# Patient Record
Sex: FEMALE | Race: WHITE | NOT HISPANIC OR LATINO | Employment: OTHER | URBAN - METROPOLITAN AREA
[De-identification: names, ages, dates, MRNs, and addresses within clinical notes are randomized per-mention and may not be internally consistent; named-entity substitution may affect disease eponyms.]

---

## 2017-02-27 ENCOUNTER — ALLSCRIPTS OFFICE VISIT (OUTPATIENT)
Dept: OTHER | Facility: OTHER | Age: 47
End: 2017-02-27

## 2017-02-27 DIAGNOSIS — J20.9 ACUTE BRONCHITIS: ICD-10-CM

## 2017-02-27 DIAGNOSIS — Z00.00 ENCOUNTER FOR GENERAL ADULT MEDICAL EXAMINATION WITHOUT ABNORMAL FINDINGS: ICD-10-CM

## 2017-02-27 DIAGNOSIS — F41.9 ANXIETY DISORDER: ICD-10-CM

## 2017-02-27 DIAGNOSIS — Z87.09 PERSONAL HISTORY OF OTHER DISEASES OF THE RESPIRATORY SYSTEM: ICD-10-CM

## 2017-02-28 ENCOUNTER — GENERIC CONVERSION - ENCOUNTER (OUTPATIENT)
Dept: OTHER | Facility: OTHER | Age: 47
End: 2017-02-28

## 2017-02-28 ENCOUNTER — HOSPITAL ENCOUNTER (OUTPATIENT)
Dept: RADIOLOGY | Facility: MEDICAL CENTER | Age: 47
Discharge: HOME | End: 2017-02-28
Payer: COMMERCIAL

## 2017-02-28 ENCOUNTER — APPOINTMENT (OUTPATIENT)
Dept: LAB | Facility: MEDICAL CENTER | Age: 47
End: 2017-02-28
Payer: COMMERCIAL

## 2017-02-28 DIAGNOSIS — J20.9 ACUTE BRONCHITIS: ICD-10-CM

## 2017-02-28 DIAGNOSIS — F41.9 ANXIETY DISORDER: ICD-10-CM

## 2017-02-28 DIAGNOSIS — Z00.00 ENCOUNTER FOR GENERAL ADULT MEDICAL EXAMINATION WITHOUT ABNORMAL FINDINGS: ICD-10-CM

## 2017-02-28 DIAGNOSIS — Z87.09 PERSONAL HISTORY OF OTHER DISEASES OF THE RESPIRATORY SYSTEM: ICD-10-CM

## 2017-02-28 LAB
ALBUMIN SERPL BCP-MCNC: 3.1 G/DL (ref 3.5–5)
ALP SERPL-CCNC: 62 U/L (ref 46–116)
ALT SERPL W P-5'-P-CCNC: 14 U/L (ref 12–78)
ANION GAP SERPL CALCULATED.3IONS-SCNC: 10 MMOL/L (ref 4–13)
AST SERPL W P-5'-P-CCNC: 9 U/L (ref 5–45)
BASOPHILS # BLD AUTO: 0.07 THOUSANDS/ΜL (ref 0–0.1)
BASOPHILS NFR BLD AUTO: 1 % (ref 0–1)
BILIRUB SERPL-MCNC: 0.37 MG/DL (ref 0.2–1)
BUN SERPL-MCNC: 12 MG/DL (ref 5–25)
CALCIUM SERPL-MCNC: 8.3 MG/DL (ref 8.3–10.1)
CHLORIDE SERPL-SCNC: 102 MMOL/L (ref 100–108)
CHOLEST SERPL-MCNC: 151 MG/DL (ref 50–200)
CO2 SERPL-SCNC: 24 MMOL/L (ref 21–32)
CREAT SERPL-MCNC: 0.7 MG/DL (ref 0.6–1.3)
EOSINOPHIL # BLD AUTO: 0.41 THOUSAND/ΜL (ref 0–0.61)
EOSINOPHIL NFR BLD AUTO: 5 % (ref 0–6)
ERYTHROCYTE [DISTWIDTH] IN BLOOD BY AUTOMATED COUNT: 12.3 % (ref 11.6–15.1)
GFR SERPL CREATININE-BSD FRML MDRD: >60 ML/MIN/1.73SQ M
GLUCOSE SERPL-MCNC: 82 MG/DL (ref 65–140)
HCT VFR BLD AUTO: 43.3 % (ref 34.8–46.1)
HDLC SERPL-MCNC: 39 MG/DL (ref 40–60)
HGB BLD-MCNC: 14.8 G/DL (ref 11.5–15.4)
LDLC SERPL CALC-MCNC: 49 MG/DL (ref 0–100)
LYMPHOCYTES # BLD AUTO: 2.97 THOUSANDS/ΜL (ref 0.6–4.47)
LYMPHOCYTES NFR BLD AUTO: 39 % (ref 14–44)
MCH RBC QN AUTO: 30 PG (ref 26.8–34.3)
MCHC RBC AUTO-ENTMCNC: 34.2 G/DL (ref 31.4–37.4)
MCV RBC AUTO: 88 FL (ref 82–98)
MONOCYTES # BLD AUTO: 0.52 THOUSAND/ΜL (ref 0.17–1.22)
MONOCYTES NFR BLD AUTO: 7 % (ref 4–12)
NEUTROPHILS # BLD AUTO: 3.64 THOUSANDS/ΜL (ref 1.85–7.62)
NEUTS SEG NFR BLD AUTO: 48 % (ref 43–75)
NRBC BLD AUTO-RTO: 0 /100 WBCS
PLATELET # BLD AUTO: 337 THOUSANDS/UL (ref 149–390)
PMV BLD AUTO: 9.3 FL (ref 8.9–12.7)
POTASSIUM SERPL-SCNC: 3.7 MMOL/L (ref 3.5–5.3)
PROT SERPL-MCNC: 7.2 G/DL (ref 6.4–8.2)
RBC # BLD AUTO: 4.94 MILLION/UL (ref 3.81–5.12)
SODIUM SERPL-SCNC: 136 MMOL/L (ref 136–145)
T4 FREE SERPL-MCNC: 1.27 NG/DL (ref 0.76–1.46)
TRIGL SERPL-MCNC: 316 MG/DL
TSH SERPL DL<=0.05 MIU/L-ACNC: <0.007 UIU/ML (ref 0.36–3.74)
WBC # BLD AUTO: 7.62 THOUSAND/UL (ref 4.31–10.16)

## 2017-02-28 PROCEDURE — 36415 COLL VENOUS BLD VENIPUNCTURE: CPT

## 2017-02-28 PROCEDURE — 84439 ASSAY OF FREE THYROXINE: CPT

## 2017-02-28 PROCEDURE — 71020 HB CHEST X-RAY 2VW FRONTAL&LATL: CPT

## 2017-02-28 PROCEDURE — 85025 COMPLETE CBC W/AUTO DIFF WBC: CPT

## 2017-02-28 PROCEDURE — 84443 ASSAY THYROID STIM HORMONE: CPT

## 2017-02-28 PROCEDURE — 80053 COMPREHEN METABOLIC PANEL: CPT

## 2017-02-28 PROCEDURE — 86618 LYME DISEASE ANTIBODY: CPT

## 2017-02-28 PROCEDURE — 80061 LIPID PANEL: CPT

## 2017-03-01 ENCOUNTER — GENERIC CONVERSION - ENCOUNTER (OUTPATIENT)
Dept: OTHER | Facility: OTHER | Age: 47
End: 2017-03-01

## 2017-03-01 LAB
B BURGDOR IGG SER IA-ACNC: 0.18
B BURGDOR IGM SER IA-ACNC: 0.15

## 2017-04-08 ENCOUNTER — GENERIC CONVERSION - ENCOUNTER (OUTPATIENT)
Dept: OTHER | Facility: OTHER | Age: 47
End: 2017-04-08

## 2017-04-11 ENCOUNTER — GENERIC CONVERSION - ENCOUNTER (OUTPATIENT)
Dept: OTHER | Facility: OTHER | Age: 47
End: 2017-04-11

## 2017-04-13 ENCOUNTER — ALLSCRIPTS OFFICE VISIT (OUTPATIENT)
Dept: OTHER | Facility: OTHER | Age: 47
End: 2017-04-13

## 2017-04-17 ENCOUNTER — GENERIC CONVERSION - ENCOUNTER (OUTPATIENT)
Dept: OTHER | Facility: OTHER | Age: 47
End: 2017-04-17

## 2017-05-05 ENCOUNTER — GENERIC CONVERSION - ENCOUNTER (OUTPATIENT)
Dept: OTHER | Facility: OTHER | Age: 47
End: 2017-05-05

## 2017-05-15 ENCOUNTER — GENERIC CONVERSION - ENCOUNTER (OUTPATIENT)
Dept: OTHER | Facility: OTHER | Age: 47
End: 2017-05-15

## 2017-05-16 ENCOUNTER — GENERIC CONVERSION - ENCOUNTER (OUTPATIENT)
Dept: OTHER | Facility: OTHER | Age: 47
End: 2017-05-16

## 2017-05-28 DIAGNOSIS — E05.90 THYROTOXICOSIS WITHOUT THYROID STORM: ICD-10-CM

## 2017-07-11 ENCOUNTER — GENERIC CONVERSION - ENCOUNTER (OUTPATIENT)
Dept: OTHER | Facility: OTHER | Age: 47
End: 2017-07-11

## 2017-07-18 ENCOUNTER — GENERIC CONVERSION - ENCOUNTER (OUTPATIENT)
Dept: OTHER | Facility: OTHER | Age: 47
End: 2017-07-18

## 2017-09-13 ENCOUNTER — ALLSCRIPTS OFFICE VISIT (OUTPATIENT)
Dept: OTHER | Facility: OTHER | Age: 47
End: 2017-09-13

## 2017-10-18 ENCOUNTER — ALLSCRIPTS OFFICE VISIT (OUTPATIENT)
Dept: OTHER | Facility: OTHER | Age: 47
End: 2017-10-18

## 2017-10-25 NOTE — PROGRESS NOTES
Assessment  1  Benign essential hypertension (401 1) (I10)   2  Cervical radiculopathy at C6 (723 4) (M54 12)    Plan  Benign essential hypertension    · Lisinopril 10 MG Oral Tablet; Take 1 tablet daily   · Follow-up visit in 3 months Evaluation and Treatment  Follow-up  Status: Complete   Done: [id number]  Cervical radiculopathy at C6    · Methocarbamol 750 MG Oral Tablet (Robaxin-750); TAKE 1 TABLET 3 times daily  PRN    Discussion/Summary    Discussed patient's hypertension medication with her in detail today  She has responded very well lisinopril 10 mg once daily  We will continue on current dose which I refilled for her today and she is now to return to the office in 3 months for re-evaluation, sooner as needed if any issues arise  I also refilled her methocarbamol for p r n  use today  Possible side effects of new medications were reviewed with the patient/guardian today  The treatment plan was reviewed with the patient/guardian  The patient/guardian understands and agrees with the treatment plan      Chief Complaint  Pt presents for 1M f/u  Pt d/c Alprazolam and needs refill on Robaxin  History of Present Illness  Patient presents for follow-up of hypertension  She is taking lisinopril 10 mg daily compliantly without issues  She has no other complaints at today's visit  She does need a refill methocarbamol which she takes for chronic neck pain due to C6 radiculopathy  Review of Systems    Constitutional: No fever, no chills, feels well, no tiredness, no recent weight gain or weight loss  Cardiovascular: No complaints of slow heart rate, no fast heart rate, no chest pain, no palpitations, no leg claudication, no lower extremity edema  Respiratory: No complaints of shortness of breath, no wheezing, no cough, no SOB on exertion, no orthopnea, no PND  Gastrointestinal: No complaints of abdominal pain, no constipation, no nausea or vomiting, no diarrhea, no bloody stools     Genitourinary: No complaints of dysuria, no incontinence, no pelvic pain, no dysmenorrhea, no vaginal discharge or bleeding  Active Problems  1  Acute costochondritis (733 6) (M94 0)   2  Acute hip pain, right (719 45) (M25 551)   3  Acute upper respiratory infection (465 9) (J06 9)   4  Allergic rhinitis due to pollen (477 0) (J30 1)   5  Anxiety (300 00) (F41 9)   6  Atypical chest pain (786 59) (R07 89)   7  Benign essential hypertension (401 1) (I10)   8  Bilateral low back pain with right-sided sciatica (724 3) (M54 41)   9  Contact dermatitis due to adhesives, unspecified contact dermatitis type (692 4) (L23 1)   10  Cough (786 2) (R05)   11  Depression (311) (F32 9)   12  History of frequent upper respiratory infection (V12 69) (Z87 09)   13  Hyperthyroidism (242 90) (E05 90)   14  Neck pain (723 1) (M54 2)   15  Paresthesia of left lower extremity (782 0) (R20 2)   16  Recurrent cold sores (054 9) (B00 1)    Past Medical History  1  History of Dog bite, hand (882 0,E906 0) (T78 359Y,V06  0XXA)   2  History of acute sinusitis (V12 69) (Z87 09)   3  History of acute sinusitis (V12 69) (Z87 09)   4  History of backache (V13 59) (Z87 39)   5  History of headache (V13 89) (Z87 898)   6  History of Neck Sprain (847 0)    Surgical History  1  History of Nasal Septal Deviation Repair    Family History  Mother    1  Family history of Colon cancer  Father    2  Family history of    3  Family history of alcoholism (V17 0) (Z81 1)   4  Family history of chronic obstructive pulmonary disease (V17 6) (Z82 5)   5  Family history of depression (V17 0) (Z81 8)  Sister    10  Family history of depression (V17 0) (Z81 8)  Family History    7  Family history of Allergic Rhinitis   8  Family history of Benign Essential Hypertension   9  Family history of Coronary Artery Disease (V17 49)   10  Family history of Diabetes Mellitus (V18 0)   11   Denied: Family history of substance abuse    Social History   · Being A Social Drinker   · Denied: History of Drug Use   · Never A Smoker  The social history was reviewed and is unchanged  Current Meds   1  ALPRAZolam 0 5 MG Oral Tablet; 1/2 - 1 BID PRN; Therapy: 01MVL0561 to (Last Rx:13Sep2017) Ordered   2  Amrix 15 MG Oral Capsule Extended Release 24 Hour; TAKE 1 CAPSULE Bedtime PRN; Therapy: 13Hhl1534 to (Evaluate:23May2017); Last Rx:13Apr2017 Ordered   3  EpiPen 2-Ralph 0 3 MG/0 3ML Injection Solution Auto-injector; INJECT 0 3ML   INTRAMUSCULARLY AS DIRECTED; Therapy: 31FMC7524 to (Last Rx:08Sep2016)  Requested for: 08Sep2016 Ordered   4  Lisinopril 10 MG Oral Tablet; Take 1 tablet daily; Therapy: 71Aha3369 to (Evaluate:12Nov2017)  Requested for: 13Sep2017; Last   Rx:13Sep2017 Ordered   5  Robaxin-750 750 MG Oral Tablet; Take 1 tablet daily; Therapy: 56FMV2430 to Recorded   6  Sprintec 28 0 25-35 MG-MCG Oral Tablet; TAKE 1 TABLET Bedtime; Therapy: 49LFQ8195 to Recorded   7  ValACYclovir HCl - 1 GM Oral Tablet; TAKE 2 TABLETS BY MOUTH EVERY 12 HOURS   FOR 2 DOSES AS NEEDED; Therapy: 72FKC1488 to (Evaluate:73Tgf3841)  Requested for: 13Sep2017; Last   Rx:13Sep2017 Ordered   8  Viibryd 10 MG Oral Tablet; take 1 tablet daily prn; Therapy: 35OCD4677 to Recorded   9  Viibryd 10 MG Oral Tablet; Take 1 tablet daily; Therapy: 19YTK8423 to (Evaluate:07Mar2017); Last Rx:59Quv7247 Ordered    The medication list was reviewed and updated today  Allergies  1  Sertraline HCl TABS  2  Eggs    Vitals  Vital Signs    Recorded: 87FOR1627 04:05PM   Temperature 99 1 F, Tympanic    Heart Rate 81    Pulse Quality Normal    Respiration Quality Normal    Respiration 16    Systolic 253, LUE, Sitting    Diastolic 86, LUE, Sitting    Height 5 ft 8 in    Patient Refused Weight Yes Yes   O2 Saturation 98, RA    LMP 83Idw3839    Pain Scale 0      Physical Exam    Constitutional   General appearance: No acute distress, well appearing and well nourished      Pulmonary   Respiratory effort: No increased work of breathing or signs of respiratory distress  Auscultation of lungs: Clear to auscultation  Cardiovascular   Auscultation of heart: Normal rate and rhythm, normal S1 and S2, without murmurs  Psychiatric   Orientation to person, place, and time: Normal     Mood and affect: Normal     Vital signs reviewed   Additional Exam:  Vital signs were reviewed  Future Appointments    Date/Time Provider Specialty Site   01/18/2018 10:45 AM Haley Sellers, [facility] [address]     Signatures   Electronically signed by :  Dayanara Ny, [facility]; Oct 23 2017  9:59AM EST                       (Author)    Electronically signed by : Kath Heath DO; Oct 24 2017 10:27AM EST

## 2017-10-27 NOTE — PROGRESS NOTES
Assessment  1  Acute upper respiratory infection (465 9) (J06 9)   2  Benign essential hypertension (401 1) (I10)   3  Anxiety (300 00) (F41 9)    Plan  Anxiety    · ALPRAZolam 0 5 MG Oral Tablet; 1/2 - 1 BID PRN  Benign essential hypertension    · Lisinopril 10 MG Oral Tablet; Take 1 tablet daily  Recurrent cold sores    · ValACYclovir HCl - 1 GM Oral Tablet; TAKE 2 TABLETS BY MOUTH EVERY 12  HOURS FOR 2 DOSES AS NEEDED    Discussion/Summary    Discussed condition with patient  She appears to have an acute upper respiratory infection and may be viral but she has a history of multiple environmental allergies as well as allergies and illness-induced asthma and she has been prone to pneumonia in the past so we will treat her with an oral antibiotic today and discussed hydration, rest, OTC cold meds, and use of inhaler as needed  She is to follow-up if symptoms persist or worsen  Regarding her blood pressure, it is 160/84 in the office today and has been elevated going back previous visits consecutively  Her blood pressure monitor at home was also reading high and she does not have many significant risk factors for hypertension  She is agreeable to trial of medication we agreed to start her on lisinopril 10 mg once daily and she is to continue monitoring blood pressure home daily  She is to return the office in one month for reevaluation, sooner as needed if any issues arise  She also need a refill of alprazolam today which was approved and her anxiety appears to be well-controlled  I also refilled her Valtrex today  Possible side effects of new medications were reviewed with the patient/guardian today  The treatment plan was reviewed with the patient/guardian  The patient/guardian understands and agrees with the treatment plan      Chief Complaint  Pt presents with c/o of cough, congestion, ST and fever x 2 days and has concerns regarding elevated BP  Pt's BP in office today is 148/90   Pt has taken Mucinex with little relief  Pt needs refill on Alprazolam and Valacyclovir  Mammogram scheduled for December  History of Present Illness  HPI: Pt  presents with 2 day history of fever, chills, cough, ST  Her cough is dry  Denies N/V/D  She has taken Mucinex Cold and Flu  Has hx of allergies but no asthma  Does not smoke  Reports her allergies are perennial/year round  Has rescue inhaler to use PRN  She would also like to discuss her blood pressure today as it has been elevated and she has been using a home blood pressure monitor and she reports that her readings have been high  She does not consume excessive amounts of caffeine and says she does not have salt in her house and she does not smoke or drink alcohol  Review of Systems    Constitutional: as noted in HPI    ENT: as noted in HPI  Cardiovascular: no complaints of slow or fast heart rate, no chest pain, no palpitations, no leg claudication or lower extremity edema  Respiratory: as noted in HPI  Gastrointestinal: no complaints of abdominal pain, no constipation, no nausea or diarrhea, no vomiting, no bloody stools  Genitourinary: no complaints of dysuria, no incontinence, no pelvic pain, no dysmenorrhea, no vaginal discharge or abnormal vaginal bleeding  Active Problems  1  Acute costochondritis (733 6) (M94 0)   2  Acute hip pain, right (719 45) (M25 551)   3  Allergic rhinitis due to pollen (477 0) (J30 1)   4  Anxiety (300 00) (F41 9)   5  Atypical chest pain (786 59) (R07 89)   6  Bilateral low back pain with right-sided sciatica (724 3) (M54 41)   7  Contact dermatitis due to adhesives, unspecified contact dermatitis type (692 4) (L23 1)   8  Cough (786 2) (R05)   9  Depression (311) (F32 9)   10  History of frequent upper respiratory infection (V12 69) (Z87 09)   11  Hyperthyroidism (242 90) (E05 90)   12  Neck pain (723 1) (M54 2)   13  Paresthesia of left lower extremity (782 0) (R20 2)   14   Recurrent cold sores (054 9) (B00 1)    Past Medical History  1  History of Dog bite, hand (882 0,E906 0) (Q66 219R,D17  0XXA)   2  History of acute sinusitis (V12 69) (Z87 09)   3  History of acute sinusitis (V12 69) (Z87 09)   4  History of backache (V13 59) (Z87 39)   5  History of headache (V13 89) (Z87 898)   6  History of Neck Sprain (847 0)    Family History  Mother    1  Family history of Colon cancer  Father    2  Family history of chronic obstructive pulmonary disease (V17 6) (Z82 5)  Family History    3  Family history of Allergic Rhinitis   4  Family history of Benign Essential Hypertension   5  Family history of Coronary Artery Disease (V17 49)   6  Family history of Diabetes Mellitus (V18 0)    Social History   · Being A Social Drinker   · Denied: History of Drug Use   · Never A Smoker  The social history was reviewed and is unchanged  Surgical History  1  History of Nasal Septal Deviation Repair    Current Meds   1  ALPRAZolam 0 5 MG Oral Tablet; 1/2 - 1 BID PRN; Therapy: 26XPU8928 to (Last Rx:12Nov2015) Ordered   2  Amrix 15 MG Oral Capsule Extended Release 24 Hour; TAKE 1 CAPSULE Bedtime PRN; Therapy: 91Umw5201 to (Evaluate:07Pwu8142); Last Rx:13Apr2017 Ordered   3  Betamethasone Dipropionate Aug 0 05 % External Cream; APPLY  AND RUB  IN A THIN   FILM TO AFFECTED AREAS TWICE DAILY  (AM AND PM); Therapy: 11NTH7979 to (Last Rx:24Npa9733)  Requested for: 78Hty8865 Ordered   4  EpiPen 2-Ralph 0 3 MG/0 3ML Injection Solution Auto-injector; INJECT 0 3ML   INTRAMUSCULARLY AS DIRECTED; Therapy: 60RUX6005 to (Last Rx:68Juz5957)  Requested for: 52Bku9593 Ordered   5  ValACYclovir HCl - 1 GM Oral Tablet; TAKE 2 TABLETS BY MOUTH EVERY 12 HOURS   FOR 2 DOSES AS NEEDED; Therapy: 59CLV2537 to (Evaluate:99Tbm0054)  Requested for: 04QHB6133; Last   Rx:68Nwm6599 Ordered   6  Viibryd 10 MG Oral Tablet; Take 1 tablet daily; Therapy: 67ANM9261 to (Evaluate:07Mar2017); Last Rx:94Tfc1085 Ordered    The medication list was reviewed and updated today  Allergies  1  Sertraline HCl TABS  2  Eggs    Vitals   ** Printed in Appendix #1 below  Physical Exam    Constitutional   General appearance: No acute distress, well appearing and well nourished  Ears, Nose, Mouth, and Throat   External inspection of ears and nose: Normal     Otoscopic examination: Tympanic membranes translucent with normal light reflex  Canals patent without erythema  Nasal mucosa, septum, and turbinates: Abnormal  -- B/L boggy turbinates  Oropharynx: Abnormal  -- PND; no exudates  Pulmonary   Respiratory effort: No increased work of breathing or signs of respiratory distress  Auscultation of lungs: Clear to auscultation  Cardiovascular   Auscultation of heart: Normal rate and rhythm, normal S1 and S2, without murmurs  Lymphatic   Palpation of lymph nodes in neck: No lymphadenopathy  Psychiatric   Orientation to person, place, and time: Normal     Mood and affect: Normal     Additional Exam:  Vital signs were reviewed; neck supple  Future Appointments    Date/Time Provider Specialty Site   10/18/2017 04:00 PM Farrah Santiago [facility] [address]   Electronically signed by :  Dale Kenny, [facility]; Sep 13 2017  4:57PM EST                       (Author)    Electronically signed by : Mary Guerra DO; Sep 27 2017  6:34AM EST                       (Co-author)    Appendix #1     Patient: Aminah Owusu; : 1970; MRN: [id number]      Recorded: 2017 03:03PM Recorded: 2017 02:45PM   Temperature  98 9 F, Tympanic    Heart Rate  97    Pulse Quality  Normal    Respiration Quality  Normal    Respiration  16    Systolic 006 847, LUE, Sitting    Diastolic 84 90, LUE, Sitting    BP CUFF SIZE  Large    Patient Refused Height  Yes Yes   Patient Refused Weight  Yes Yes   O2 Saturation  97, RA    LMP  84JZN9424    Pain Scale  0

## 2017-11-28 ENCOUNTER — GENERIC CONVERSION - ENCOUNTER (OUTPATIENT)
Dept: OTHER | Facility: OTHER | Age: 47
End: 2017-11-28

## 2017-12-04 ENCOUNTER — GENERIC CONVERSION - ENCOUNTER (OUTPATIENT)
Dept: FAMILY MEDICINE CLINIC | Facility: CLINIC | Age: 47
End: 2017-12-04

## 2017-12-08 PROCEDURE — 87624 HPV HI-RISK TYP POOLED RSLT: CPT | Performed by: OBSTETRICS & GYNECOLOGY

## 2017-12-08 PROCEDURE — G0145 SCR C/V CYTO,THINLAYER,RESCR: HCPCS | Performed by: OBSTETRICS & GYNECOLOGY

## 2017-12-12 ENCOUNTER — LAB REQUISITION (OUTPATIENT)
Dept: LAB | Facility: HOSPITAL | Age: 47
End: 2017-12-12
Payer: COMMERCIAL

## 2017-12-12 DIAGNOSIS — Z01.419 ENCOUNTER FOR GYNECOLOGICAL EXAMINATION WITHOUT ABNORMAL FINDING: ICD-10-CM

## 2017-12-12 DIAGNOSIS — Z11.51 ENCOUNTER FOR SCREENING FOR HUMAN PAPILLOMAVIRUS (HPV): ICD-10-CM

## 2017-12-14 LAB
HPV RRNA GENITAL QL NAA+PROBE: NORMAL
LAB AP GYN PRIMARY INTERPRETATION: NORMAL
LAB AP LMP: NORMAL
Lab: NORMAL

## 2018-01-05 ENCOUNTER — GENERIC CONVERSION - ENCOUNTER (OUTPATIENT)
Dept: OTHER | Facility: OTHER | Age: 48
End: 2018-01-05

## 2018-01-05 ENCOUNTER — GENERIC CONVERSION - ENCOUNTER (OUTPATIENT)
Dept: FAMILY MEDICINE CLINIC | Facility: CLINIC | Age: 48
End: 2018-01-05

## 2018-01-11 NOTE — RESULT NOTES
Verified Results  (1) LYME ANTIBODY PROFILE [facility] TO WESTERN BLOT 68QYT9893 08:48AM Rohan Hyatt Order Number: [id number]     Test Name Result Flag Reference   LYME IGG 0 18  0 00-0 79   NEGATIVE(0 00-0 79)-Absence of detectable Borrelia IgG Antibodies  A negative result does not exclude the possibility of Borrelia infection  If early Lyme disease is suspected,a second sample should be collected & tested 4 weeks after initial testing  LYME IGM 0 15  0 00-0 79   NEGATIVE (0 00-0 79)-Absence of detectable Borrelia IgM antibodies  A negative result does not exclude the possibility of Borrelia infection  If early lyme disease is suspected, a second sample should be collected & tested 4 weeks after initial testing  * XR CHEST PA & LATERAL 95DUX0340 08:28AM Rohan Hyatt Order Number: [id number]     Test Name Result Flag Reference   XR CHEST PA & LATERAL (Report)     CHEST      INDICATION: J20 9: Acute bronchitis, unspecified  History taken directly from the electronic ordering system  COMPARISON: None     VIEWS: Frontal and lateral projections     IMAGES: 2     FINDINGS:        Cardiomediastinal silhouette appears unremarkable  The lungs are clear  No pneumothorax or pleural effusion  Visualized osseous structures appear within normal limits for the patient's age  IMPRESSION:     No active pulmonary disease         Workstation performed: [id number]     Signed by:   Yong Vines MD   3/1/17

## 2018-01-12 VITALS
BODY MASS INDEX: 30.18 KG/M2 | OXYGEN SATURATION: 98 % | WEIGHT: 199.13 LBS | SYSTOLIC BLOOD PRESSURE: 138 MMHG | DIASTOLIC BLOOD PRESSURE: 92 MMHG | RESPIRATION RATE: 17 BRPM | HEART RATE: 86 BPM | HEIGHT: 68 IN | TEMPERATURE: 98.7 F

## 2018-01-12 NOTE — PROGRESS NOTES
Assessment    1  Encounter for preventive health examination (V70 0) (Z00 00)   2  Anxiety (300 00) (F41 9)   3  History of frequent upper respiratory infection (V12 69) (Z87 09)   4  Acute bronchitis (466 0) (J20 9)    Plan  Acute bronchitis    · Promethazine-Codeine 6 25-10 MG/5ML Oral Syrup; TAKE 5 ML EVERY 4 HOURS  AS NEEDED   · * XR CHEST PA & LATERAL; Status:Active; Requested for:74Cna8743;   Acute bronchitis, Anxiety, Health Maintenance, History of frequent upper respiratory  infection    · (1) CBC/PLT/DIFF; Status:Active; Requested for:77Iny6598;    · (1) COMPREHENSIVE METABOLIC PANEL; Status:Active; Requested for:36Rxn3928;    · (1) LIPID PANEL FASTING W DIRECT LDL REFLEX; Status:Active; Requested  for:77Hbx7837;    · (1) LYME ANTIBODY PROFILE W/REFLEX TO WESTERN BLOT; Status:Active; Requested for:29Xcn1705;    · (1) TSH WITH FT4 REFLEX; Status:Active; Requested for:03Dos3819;     Discussion/Summary    50 yo PE/check up  Will give Rx for routine fasting blood work  Will call with results    --Frequent URIs: pt has been getting frequent respiratory infections this winter (since oct)  she has been seen by [facility] urgent care 3 times (pneumonia, influenza, and viral uri)  pt c/o intermittent fevers (none currently), cough, pnd, congestion  Upon examination, her lungs are somewhat coarse  Will send for chest x-ray, will give Rx for Prometh with codeine, and will check labs  Will call  --Anxiety: Situational  Overall has improved greatly since her work situation has improved  Patient still working for "[employer] but they are no longer requiring her to travel to [place]  Doing well since discontinuing viibryd  Still takes alprazolam on rare occasions without side effects  Will call  Possible side effects of new medications were reviewed with the patient/guardian today  The treatment plan was reviewed with the patient/guardian   The patient/guardian understands and agrees with the treatment plan      Chief Complaint  PT presents for Annual physical       History of Present Illness  HPI: 52 yo PE/check up  pt has been getting frequent respiratory infections this winter (since oct)  she has been seen by [facility] urgent care 3 times (pneumonia, influenza, and viral uri)  pt c/o intermittent fevers (none currently), cough, pnd, congestion  pt recently stopped her viibryd  she is doing well off med  still w/ rare use of alprazolam       Review of Systems    Constitutional: No fever, no chills, feels well, no tiredness, no recent weight gain or weight loss  ENT: as noted in HPI  Cardiovascular: No complaints of slow heart rate, no fast heart rate, no chest pain, no palpitations, no leg claudication, no lower extremity edema  Respiratory: as noted in HPI  Gastrointestinal: No complaints of abdominal pain, no constipation, no nausea or vomiting, no diarrhea, no bloody stools  Genitourinary: No complaints of dysuria, no incontinence, no pelvic pain, no dysmenorrhea, no vaginal discharge or bleeding  Over the past 2 weeks, how often have you been bothered by the following problems? 1 ) Little interest or pleasure in doing things? Not at all    2 ) Feeling down, depressed or hopeless? Not at all    3 ) Trouble falling asleep or sleeping too much? Several days  4 ) Feeling tired or having little energy? Several days  5 ) Poor appetite or overeating? Several days  6 ) Feeling bad about yourself, or that you are a failure, or have let yourself or your family down? Not at all    7 ) Trouble concentrating on things, such as reading a newspaper or watching television? Not at all    8 ) Moving or speaking so slowly that other people could have noticed, or the opposite, moving or speaking faster than usual? Not at all  How difficult have these problems made it for you to do your work, take care of things at home, or get along with people? Not at all  Active Problems    1   Acute hip pain, right (815 65) (M25 551)   2  Allergic rhinitis due to pollen (477 0) (J30 1)   3  Anxiety (300 00) (F41 9)   4  Bilateral low back pain with right-sided sciatica (724 3) (M54 41)   5  Contact dermatitis due to adhesives, unspecified contact dermatitis type (692 4) (L23 1)   6  Depression (311) (F32 9)   7  Neck pain (723 1) (M54 2)   8  Paresthesia of left lower extremity (782 0) (R20 2)   9  Recurrent cold sores (054 9) (B00 1)    Past Medical History    · History of Dog bite, hand (882 0,E906 0) (R86 755L,Q27  0XXA)   · History of acute sinusitis (V12 69) (Z87 09)   · History of acute sinusitis (V12 69) (Z87 09)   · History of backache (V13 59) (Z87 39)   · History of headache (V13 89) (Z87 898)   · History of Neck Sprain (847 0)    Surgical History    · History of Nasal Septal Deviation Repair    Family History  Mother    · Family history of Colon cancer  Father    · Family history of chronic obstructive pulmonary disease (V17 6) (Z82 5)  Family History    · Family history of Allergic Rhinitis   · Family history of Benign Essential Hypertension   · Family history of Coronary Artery Disease (V17 49)   · Family history of Diabetes Mellitus (V18 0)    Social History    · Being A Social Drinker   · Denied: History of Drug Use   · Never A Smoker    Current Meds   1  ALPRAZolam 0 5 MG Oral Tablet; 1/2 - 1 BID PRN; Therapy: 84WVF9238 to (Last Rx:12Nov2015) Ordered   2  Betamethasone Dipropionate Aug 0 05 % External Cream; APPLY  AND RUB  IN A THIN   FILM TO AFFECTED AREAS TWICE DAILY  (AM AND PM); Therapy: 58QMF3014 to (Last Rx:08Sep2016)  Requested for: 08Sep2016 Ordered   3  EpiPen 2-Ralph 0 3 MG/0 3ML Injection Solution Auto-injector; INJECT 0 3ML   INTRAMUSCULARLY AS DIRECTED; Therapy: 63IFM9502 to (Last Rx:08Sep2016)  Requested for: 08Sep2016 Ordered   4  ValACYclovir HCl - 1 GM Oral Tablet; TAKE 2 TABLETS BY MOUTH EVERY 12 HOURS   FOR 2 DOSES AS NEEDED;    Therapy: 59BKZ1958 to (Evaluate:81Siv6528)  Requested for: 12VEV1170; Last   Rx:78Exz9343 Ordered   5  Viibryd 10 MG Oral Tablet; Take 1 tablet daily; Therapy: 87XAR0514 to (Evaluate:07Mar2017); Last Rx:26Jlm9006 Ordered    Allergies    1  Sertraline HCl TABS    2  Eggs    Vitals   Recorded: 47Oxg6552 03:03PM   Temperature 98 2 F, Tympanic   Heart Rate 95   Respiration Quality Norm   Respiration 16   Systolic 132   Diastolic 90   Height 5 ft 7 in   Weight 197 lb 3 04 oz   BMI Calculated 30 88   BSA Calculated 2 01   O2 Saturation 95, RA   LMP 01-Feb-2017   Pain Scale 0     Physical Exam    Constitutional   General appearance: No acute distress, well appearing and well nourished  Ears, Nose, Mouth, and Throat   Otoscopic examination: Abnormal   turbs mildly inflamed  Oropharynx: Normal with no erythema, edema, exudate or lesions  Pulmonary   Respiratory effort: No increased work of breathing or signs of respiratory distress  Auscultation of lungs: Abnormal   clearing w/ cough  Cardiovascular   Palpation of heart: Normal PMI, no thrills  Auscultation of heart: Normal rate and rhythm, normal S1 and S2, without murmurs  Examination of extremities for edema and/or varicosities: Normal     Abdomen   Abdomen: Non-tender, no masses  Liver and spleen: No hepatomegaly or splenomegaly  Lymphatic   Palpation of lymph nodes in neck: No lymphadenopathy  Psychiatric   Orientation to person, place, and time: Normal     Mood and affect: Normal        Signatures   Electronically signed by :  Tavia Cano DO; Feb 27 2017  3:28PM EST                       (Author)

## 2018-01-13 VITALS
SYSTOLIC BLOOD PRESSURE: 122 MMHG | RESPIRATION RATE: 16 BRPM | OXYGEN SATURATION: 98 % | HEART RATE: 81 BPM | DIASTOLIC BLOOD PRESSURE: 86 MMHG | TEMPERATURE: 99.1 F | HEIGHT: 68 IN

## 2018-01-14 VITALS
BODY MASS INDEX: 30.95 KG/M2 | TEMPERATURE: 98.2 F | OXYGEN SATURATION: 95 % | SYSTOLIC BLOOD PRESSURE: 150 MMHG | RESPIRATION RATE: 16 BRPM | HEIGHT: 67 IN | WEIGHT: 197.19 LBS | DIASTOLIC BLOOD PRESSURE: 90 MMHG | HEART RATE: 95 BPM

## 2018-01-15 VITALS
TEMPERATURE: 98.9 F | SYSTOLIC BLOOD PRESSURE: 160 MMHG | OXYGEN SATURATION: 97 % | RESPIRATION RATE: 16 BRPM | HEART RATE: 97 BPM | DIASTOLIC BLOOD PRESSURE: 84 MMHG

## 2018-01-16 NOTE — RESULT NOTES
Verified Results  (1) CBC/PLT/DIFF [id number] 08:48AM [facility] Order Number: [id number]     Test Name Result Flag Reference   WBC COUNT 7 62 Thousand/uL  4 31-10 16   RBC COUNT 4 94 Million/uL  3 81-5 12   HEMOGLOBIN 14 8 g/dL  11 5-15 4   HEMATOCRIT 43 3 %  34 8-46  1   MCV 88 fL  82-98   MCH 30 0 pg  26 8-34 3   MCHC 34 2 g/dL  31 4-37 4   RDW 12 3 %  11 6-15 1   MPV 9 3 fL  8 9-12 7   PLATELET COUNT 598 Thousands/uL  149-390   nRBC AUTOMATED 0 /100 WBCs     NEUTROPHILS RELATIVE PERCENT 48 %  43-75   LYMPHOCYTES RELATIVE PERCENT 39 %  14-44   MONOCYTES RELATIVE PERCENT 7 %  4-12   EOSINOPHILS RELATIVE PERCENT 5 %  0-6   BASOPHILS RELATIVE PERCENT 1 %  0-1   NEUTROPHILS ABSOLUTE COUNT 3 64 Thousands/? ??L  1 85-7 62   LYMPHOCYTES ABSOLUTE COUNT 2 97 Thousands/? ??L  0 60-4 47   MONOCYTES ABSOLUTE COUNT 0 52 Thousand/? ??L  0 17-1 22   EOSINOPHILS ABSOLUTE COUNT 0 41 Thousand/? ??L  0 00-0 61   BASOPHILS ABSOLUTE COUNT 0 07 Thousands/? ??L  0 00-0 10   - Patient Instructions: This bloodwork is non-fasting  Please drink two glasses of water morning of bloodwork  - Patient Instructions: This bloodwork is non-fasting  Please drink two glasses of water morning of bloodwork  (1) COMPREHENSIVE METABOLIC PANEL [id number] 54:11WM [facility] Order Number: [id number]     Test Name Result Flag Reference   GLUCOSE,RANDM 82 mg/dL     If the patient is fasting, the ADA then defines impaired fasting glucose as > 100 mg/dL and diabetes as > or equal to 123 mg/dL     SODIUM 136 mmol/L  136-145   POTASSIUM 3 7 mmol/L  3 5-5 3   CHLORIDE 102 mmol/L  100-108   CARBON DIOXIDE 24 mmol/L  21-32   ANION GAP (CALC) 10 mmol/L  4-13   BLOOD UREA NITROGEN 12 mg/dL  5-25   CREATININE 0 70 mg/dL  0 60-1 30   Standardized to IDMS reference method   CALCIUM 8 3 mg/dL  8 3-10 1   BILI, TOTAL 0 37 mg/dL  0 20-1 00   ALK PHOSPHATAS 62 U/L     ALT (SGPT) 14 U/L  12-78   AST(SGOT) 9 U/L  5-45   ALBUMIN 3 1 g/dL L 3 5-5 0   TOTAL PROTEIN 7 2 g/dL  6 4-8 2   eGFR Non-African American      >60 0 ml/min/1 73sq m   - Patient Instructions: This is a fasting blood test  Water, black tea or black coffee only after 9:00pm the night before test Drink 2 glasses of water the morning of test   National Kidney Disease Education Program recommendations are as follows:  GFR calculation is accurate only with a steady state creatinine  Chronic Kidney disease less than 60 ml/min/1 73 sq  meters  Kidney failure less than 15 ml/min/1 73 sq  meters  (1) LIPID PANEL FASTING W DIRECT LDL REFLEX [id number] 08:48AM [facility] Order Number: [id number]     Test Name Result Flag Reference   CHOLESTEROL 151 mg/dL     LDL CHOLESTEROL CALCULATED 49 mg/dL  0-100   - Patient Instructions: This is a fasting blood test  Water, black tea or black coffee only after 9:00pm the night before test   Drink 2 glasses of water the morning of test     - Patient Instructions: This is a fasting blood test  Water, black tea or black coffee only after 9:00pm the night before test Drink 2 glasses of water the morning of test   Triglyceride:         Normal              <150 mg/dl       Borderline High    150-199 mg/dl       High               200-499 mg/dl       Very High          >499 mg/dl  Cholesterol:         Desirable        <200 mg/dl      Borderline High  200-239 mg/dl      High             >239 mg/dl  HDL Cholesterol:        High    >59 mg/dL      Low     <41 mg/dL  LDL Cholesterol:        Optimal          <100 mg/dl        Near Optimal     100-129 mg/dl        Above Optimal          Borderline High   130-159 mg/dl          High              160-189 mg/dl          Very High        >189 mg/dl  LDL CALCULATED:    This screening LDL is a calculated result  It does not have the accuracy of the Direct Measured LDL in the monitoring of patients with hyperlipidemia and/or statin therapy     Direct Measure LDL (SOA188) must be ordered separately in these patients  TRIGLYCERIDES 316 mg/dL H <=150   Specimen collection should occur prior to N-Acetylcysteine or Metamizole administration due to the potential for falsely depressed results  HDL,DIRECT 39 mg/dL L 40-60   Specimen collection should occur prior to Metamizole administration due to the potential for falsely depressed results  (1) TSH WITH FT4 REFLEX [id number] 08:48AM [facility] Order Number: [id number]     Test Name Result Flag Reference   TSH <0 007 uIU/mL L 0 358-3 740   - Patient Instructions: This is a fasting blood test  Water, black tea or black coffee only after 9:00pm the night before test Drink 2 glasses of water the morning of test   Patients undergoing fluorescein dye angiography may retain small amounts of fluorescein in the body for 48-72 hours post procedure  Samples containing fluorescein can produce falsely depressed TSH values  If the patient had this procedure,a specimen should be resubmitted post fluorescein clearance  The recommended reference ranges for TSH during pregnancy are as follows:  First trimester 0 1 to 2 5 uIU/mL  Second trimester  0 2 to 3 0 uIU/mL  Third trimester 0 3 to 3 0 uIU/m   T4,FREE 1 27 ng/dL  0 76-1 46   - Patient Instructions: This is a fasting blood test  Water, black tea or black coffee only after 9:00pm the night before test Drink 2 glasses of water the morning of test        Plan  Hyperthyroidism    · (1) T4, FREE; Status:Active; Requested for:28May2017;    · (1) THYROID ANTIBODIES PANEL; Status:Active; Requested for:28May2017;    · (1) TSH; Status:Active;  Requested for:28May2017;

## 2018-01-18 ENCOUNTER — ALLSCRIPTS OFFICE VISIT (OUTPATIENT)
Dept: OTHER | Facility: OTHER | Age: 48
End: 2018-01-18

## 2018-01-19 NOTE — PROGRESS NOTES
Assessment   1  Benign essential hypertension (401 1) (I10)    Plan   Benign essential hypertension    · Lisinopril 10 MG Oral Tablet; Take 1 tablet daily   · Follow-up visit in 6 months Evaluation and Treatment  Follow-up  Status: Complete -    Scheduling  Done: 18UTC7401 01:18PM  Benign essential hypertension, Fatigue, unspecified type, Hypertriglyceridemia, Vitamin    D deficiency    · (1) CBC/PLT/DIFF; Status:Active; Requested for:89Fqa0832;    · (1) COMPREHENSIVE METABOLIC PANEL; Status:Active; Requested for:53Skq1423;    · (1) LIPID PANEL, FASTING; Status:Active; Requested for:28Oaa9752;    · (1) VITAMIN D 25-HYDROXY; Status:Active; Requested for:28Qqb6349;   PMH: History of acute sinusitis    · EpiPen 2-Ralph 0 3 MG/0 3ML Injection Solution Auto-injector; INJECT 0 3ML    INTRAMUSCULARLY AS DIRECTED    Discussion/Summary      Discussed pt's HTN and medication with her in detail today  She is well controlled on Lisinopril 10 mg daily so I refilled for her and will continue at current dose  Gave her Rx for FBW and will call with results once obtained  I renewed her Epipen today  She is to RTO in 6 months for reevaluation, sooner PRN if any issues arise  Possible side effects of new medications were reviewed with the patient/guardian today  The treatment plan was reviewed with the patient/guardian  The patient/guardian understands and agrees with the treatment plan      Chief Complaint   Pt presents for 3 month check up with no recent labs  Pt needs refill on Lisinopril and Epi Pen  Pt has orders for Mammogram and Colonoscopy, states will schedule  History of Present Illness   Pt  presents for F/U of HTN  She is feeling well and without complaints today  She is taking her Lisinopril 10 mg daily and compliantly as prescribed and needs refill today  She also needs refill of her Epipen  She will be due for yearly FBW next month   She is UTD with Pap and has Rx for mammogram       Review of Systems Constitutional: No fever, no chills, feels well, no tiredness, no recent weight gain or weight loss  Cardiovascular: No complaints of slow heart rate, no fast heart rate, no chest pain, no palpitations, no leg claudication, no lower extremity edema  Respiratory: No complaints of shortness of breath, no wheezing, no cough, no SOB on exertion, no orthopnea, no PND  Gastrointestinal: No complaints of abdominal pain, no constipation, no nausea or vomiting, no diarrhea, no bloody stools  Genitourinary: No complaints of dysuria, no incontinence, no pelvic pain, no dysmenorrhea, no vaginal discharge or bleeding  Active Problems   1  Anxiety (300 00) (F41 9)   2  Benign essential hypertension (401 1) (I10)   3  Bilateral low back pain with right-sided sciatica (724 3) (M54 41)   4  Cervical radiculopathy at C6 (723 4) (M54 12)   5  Depression (311) (F32 9)   6  Hyperthyroidism (242 90) (E05 90)    Past Medical History   1  History of Acute costochondritis (733 6) (M94 0)   2  History of Acute hip pain, right (719 45) (M25 551)   3  History of Acute upper respiratory infection (465 9) (J06 9)   4  History of Allergic rhinitis due to pollen (477 0) (J30 1)   5  History of Atypical chest pain (786 59) (R07 89)   6  History of Contact dermatitis due to adhesives, unspecified contact dermatitis type     (692 4) (L23 1)   7  History of Dog bite, hand (882 0,E906 0) (U03 598Y,K70  0XXA)   8  History of acute sinusitis (V12 69) (Z87 09)   9  History of acute sinusitis (V12 69) (Z87 09)   10  History of backache (V13 59) (Z87 39)   11  History of cough   12  History of frequent upper respiratory infection (V12 69) (Z87 09)   13  History of headache (V13 89) (Z87 898)   14  History of influenza vaccination (V49 89) (Z92 29)   15  History of neck pain (V13 59) (Z87 39)   16  History of Neck Sprain (847 0)   17  History of Paresthesia of left lower extremity (782 0) (R20 2)   18   History of Recurrent cold sores [phone]  9) (B00 1)   19  History of Screening for cervical cancer (V76 2) (Z12 4)    Surgical History   1  History of Nasal Septal Deviation Repair    Family History   Mother    1  Family history of Colon cancer  Father    2  Family history of    3  Family history of alcoholism (V17 0) (Z81 1)   4  Family history of chronic obstructive pulmonary disease (V17 6) (Z82 5)   5  Family history of depression (V17 0) (Z81 8)  Sister    10  Family history of depression (V17 0) (Z81 8)  Family History    7  Family history of Allergic Rhinitis   8  Family history of Benign Essential Hypertension   9  Family history of Coronary Artery Disease (V17 49)   10  Family history of Diabetes Mellitus (V18 0)   11  Denied: Family history of substance abuse    Social History    · Being A Social Drinker   · Denied: History of Drug Use   · Never A Smoker  The social history was reviewed and is unchanged  Current Meds    1  ALPRAZolam 0 5 MG Oral Tablet; 1/2 - 1 BID PRN; Therapy: 10SRI7359 to (Last Rx:2017) Ordered   2  EpiPen 2-Ralph 0 3 MG/0 3ML Injection Solution Auto-injector; INJECT 0 3ML     INTRAMUSCULARLY AS DIRECTED; Therapy: 24HYC6351 to (Last Rx:2016)  Requested for: 2016 Ordered   3  Flax Seed Oil 1000 MG Oral Capsule; TAKE AS DIRECTED; Therapy: 46ANT5110 to Recorded   4  Lisinopril 10 MG Oral Tablet; Take 1 tablet daily; Therapy: 28Wgq3055 to (Evaluate:2018)  Requested for: 47XDP1343; Last     Rx:2017 Ordered   5  Methocarbamol 750 MG Oral Tablet; TAKE 1 TABLET 3 times daily PRN; Therapy: 78QFS7502 to (Evaluate:2017)  Requested for: 03JEE0360; Last     Rx:2017 Ordered   6  Multivitamins Oral Capsule; Therapy: 95JWP6596 to Recorded   7  Sprintec 28 0 25-35 MG-MCG Oral Tablet; TAKE 1 TABLET Bedtime; Therapy: 99TAJ6388 to Recorded   8  Tapazole 5 MG Oral Tablet; Take 1 tablet twice daily; Therapy: 65UAF1809 to Recorded   9   ValACYclovir HCl - 1 GM Oral Tablet; TAKE 2 TABLETS BY MOUTH EVERY 12 HOURS     FOR 2 DOSES AS NEEDED; Therapy: 00SES3493 to (Evaluate:2017)  Requested for: 08Mga0426; Last     Rx:13Fpi5216 Ordered   10  Viibryd 10 MG Oral Tablet; Take 1 tablet daily; Therapy: 85PSC6596 to (Evaluate:2017); Last Rx:92Hlh6950 Ordered     The medication list was reviewed and updated today  Allergies   1  Sertraline HCl TABS  2  Eggs    Vitals   Vital Signs    ** Printed in Appendix #1 below  Physical Exam        Constitutional      General appearance: No acute distress, well appearing and well nourished  Ears, Nose, Mouth, and Throat      Oropharynx: Normal with no erythema, edema, exudate or lesions  Pulmonary      Respiratory effort: No increased work of breathing or signs of respiratory distress  Auscultation of lungs: Clear to auscultation  Cardiovascular      Auscultation of heart: Normal rate and rhythm, normal S1 and S2, without murmurs  Examination of extremities for edema and/or varicosities: Normal        Carotid pulses: Normal        Lymphatic      Palpation of lymph nodes in neck: No lymphadenopathy  Psychiatric      Orientation to person, place, and time: Normal        Mood and affect: Normal        Additional Exam:  Vital signs were reviewed; neck supple; no thyromegaly  Signatures    Electronically signed by : Patrick Cordova, [facility]; 2018  1:21PM EST                       (Author)     Electronically signed by :  Rubén Mclaughlin DO; 2018  2:45PM EST                       (Author)     Appendix #1          Vital Signs   Patient: David Sosa; : 1970; MRN: [id number]           Recorded: 87VHY5890 11:17AM Recorded: 12JTI1556 10:59AM   Temperature  98 8 F, Tympanic    Heart Rate  83    Pulse Quality  Normal    Respiration Quality  Normal    Respiration  18    Systolic 434 698, LUE, Sitting    Diastolic 66 74, LUE, Sitting    BP CUFF SIZE  Large    Patient Refused Height Yes Yes   Patient Refused Weight  Yes Yes   O2 Saturation  97, RA    LMP  11Jan2018    Pain Scale  4

## 2018-01-23 VITALS
RESPIRATION RATE: 18 BRPM | OXYGEN SATURATION: 97 % | TEMPERATURE: 98.8 F | DIASTOLIC BLOOD PRESSURE: 66 MMHG | HEART RATE: 83 BPM | SYSTOLIC BLOOD PRESSURE: 132 MMHG

## 2018-02-28 DIAGNOSIS — E78.1 PURE HYPERGLYCERIDEMIA: ICD-10-CM

## 2018-02-28 DIAGNOSIS — E55.9 VITAMIN D DEFICIENCY: ICD-10-CM

## 2018-02-28 DIAGNOSIS — I10 ESSENTIAL (PRIMARY) HYPERTENSION: ICD-10-CM

## 2018-02-28 DIAGNOSIS — R53.83 OTHER FATIGUE: ICD-10-CM

## 2018-04-30 RX ORDER — LISINOPRIL 10 MG/1
1 TABLET ORAL DAILY
COMMUNITY
Start: 2017-09-13 | End: 2018-07-27 | Stop reason: SDUPTHER

## 2018-04-30 RX ORDER — EPINEPHRINE 0.3 MG/.3ML
0.3 INJECTION SUBCUTANEOUS
COMMUNITY
Start: 2013-01-16 | End: 2019-05-13 | Stop reason: SDUPTHER

## 2018-04-30 RX ORDER — METHIMAZOLE 5 MG/1
1 TABLET ORAL DAILY
COMMUNITY
Start: 2018-01-18 | End: 2022-02-28

## 2018-04-30 RX ORDER — ALPRAZOLAM 0.5 MG/1
TABLET ORAL 2 TIMES DAILY PRN
COMMUNITY
Start: 2015-11-12 | End: 2019-05-13 | Stop reason: SDUPTHER

## 2018-04-30 RX ORDER — DIMENHYDRINATE 50 MG
TABLET ORAL
COMMUNITY
Start: 2018-01-18 | End: 2018-07-16 | Stop reason: SDUPTHER

## 2018-04-30 RX ORDER — CYCLOBENZAPRINE HYDROCHLORIDE 15 MG/1
1 CAPSULE, EXTENDED RELEASE ORAL
COMMUNITY
Start: 2017-04-13 | End: 2019-02-07

## 2018-04-30 RX ORDER — NORGESTIMATE AND ETHINYL ESTRADIOL 0.25-0.035
1 KIT ORAL
COMMUNITY
Start: 2017-10-18 | End: 2019-06-26 | Stop reason: DRUGHIGH

## 2018-04-30 RX ORDER — VALACYCLOVIR HYDROCHLORIDE 1 G/1
TABLET, FILM COATED ORAL
COMMUNITY
Start: 2015-12-03 | End: 2019-02-07 | Stop reason: SDUPTHER

## 2018-04-30 RX ORDER — METHOCARBAMOL 750 MG/1
1 TABLET, FILM COATED ORAL 3 TIMES DAILY
COMMUNITY
Start: 2017-10-18 | End: 2018-05-01 | Stop reason: SDUPTHER

## 2018-04-30 RX ORDER — VILAZODONE HYDROCHLORIDE 10 MG/1
1 TABLET ORAL DAILY
COMMUNITY
Start: 2016-03-04 | End: 2018-05-01 | Stop reason: SDUPTHER

## 2018-05-01 ENCOUNTER — TELEPHONE (OUTPATIENT)
Dept: FAMILY MEDICINE CLINIC | Facility: CLINIC | Age: 48
End: 2018-05-01

## 2018-05-01 ENCOUNTER — OFFICE VISIT (OUTPATIENT)
Dept: FAMILY MEDICINE CLINIC | Facility: CLINIC | Age: 48
End: 2018-05-01
Payer: COMMERCIAL

## 2018-05-01 ENCOUNTER — DOCUMENTATION (OUTPATIENT)
Dept: FAMILY MEDICINE CLINIC | Facility: CLINIC | Age: 48
End: 2018-05-01

## 2018-05-01 VITALS
DIASTOLIC BLOOD PRESSURE: 82 MMHG | HEART RATE: 100 BPM | RESPIRATION RATE: 16 BRPM | OXYGEN SATURATION: 97 % | SYSTOLIC BLOOD PRESSURE: 132 MMHG | TEMPERATURE: 98.6 F

## 2018-05-01 DIAGNOSIS — R55 NEAR SYNCOPE: Primary | ICD-10-CM

## 2018-05-01 DIAGNOSIS — F32.A DEPRESSION, UNSPECIFIED DEPRESSION TYPE: ICD-10-CM

## 2018-05-01 DIAGNOSIS — R42 VERTIGO: ICD-10-CM

## 2018-05-01 DIAGNOSIS — R47.81 SLURRED SPEECH: ICD-10-CM

## 2018-05-01 DIAGNOSIS — M54.9 BACK PAIN, UNSPECIFIED BACK LOCATION, UNSPECIFIED BACK PAIN LATERALITY, UNSPECIFIED CHRONICITY: ICD-10-CM

## 2018-05-01 PROCEDURE — 99214 OFFICE O/P EST MOD 30 MIN: CPT | Performed by: FAMILY MEDICINE

## 2018-05-01 RX ORDER — METHOCARBAMOL 750 MG/1
750 TABLET, FILM COATED ORAL 3 TIMES DAILY
Qty: 120 TABLET | Refills: 1 | Status: SHIPPED | OUTPATIENT
Start: 2018-05-01 | End: 2018-07-28 | Stop reason: SDUPTHER

## 2018-05-01 RX ORDER — NORGESTIMATE AND ETHINYL ESTRADIOL 0.25-0.035
KIT ORAL
COMMUNITY
End: 2018-07-16 | Stop reason: SDUPTHER

## 2018-05-01 RX ORDER — BUPIVACAINE HYDROCHLORIDE 2.5 MG/ML
1 INJECTION, SOLUTION INFILTRATION; PERINEURAL
COMMUNITY
End: 2019-02-07

## 2018-05-01 RX ORDER — PREDNISONE 20 MG/1
TABLET ORAL
COMMUNITY
Start: 2017-11-22 | End: 2018-05-01 | Stop reason: ALTCHOICE

## 2018-05-01 RX ORDER — VILAZODONE HYDROCHLORIDE 10 MG/1
10 TABLET ORAL DAILY
Qty: 90 TABLET | Refills: 1 | Status: SHIPPED | OUTPATIENT
Start: 2018-05-01 | End: 2018-07-28 | Stop reason: SDUPTHER

## 2018-05-01 RX ORDER — MECLIZINE HYDROCHLORIDE 25 MG/1
25 TABLET ORAL EVERY 8 HOURS PRN
COMMUNITY
Start: 2018-04-30

## 2018-05-01 RX ORDER — OYSTER SHELL CALCIUM WITH VITAMIN D 500; 200 MG/1; [IU]/1
1 TABLET, FILM COATED ORAL
COMMUNITY
End: 2019-05-13

## 2018-05-01 RX ORDER — ALBUTEROL SULFATE 90 UG/1
2 AEROSOL, METERED RESPIRATORY (INHALATION) EVERY 4 HOURS
COMMUNITY
Start: 2017-02-23 | End: 2018-07-16 | Stop reason: SDUPTHER

## 2018-05-01 RX ORDER — ECHINACEA 400 MG
1 CAPSULE ORAL DAILY
COMMUNITY
End: 2022-02-28

## 2018-05-01 RX ORDER — LANOLIN ALCOHOL/MO/W.PET/CERES
CREAM (GRAM) TOPICAL
COMMUNITY
End: 2022-08-03

## 2018-05-01 RX ORDER — FEXOFENADINE HCL 180 MG/1
180 TABLET ORAL DAILY
COMMUNITY
End: 2022-08-03

## 2018-05-01 NOTE — TELEPHONE ENCOUNTER
Pt was in our office on 05/01/2018 and was ordered an MRI of brain IAC wo contrast does pt need a prior auth? I informed pt to wait to schedule the appointment once she heard form you

## 2018-05-01 NOTE — ASSESSMENT & PLAN NOTE
This is a follow up from ER visit at [facility] from 4/29  Pt was seen due to episode of dizziness (vertigo), slurred speech, chest pain  Pt was brought to ER via ambulance  ER ruled out MI (EKG/labs)  She was given rx for meclizine and subsequently d/c w/ instructions to f/u w/ her PCP  Currently, she is feeling somewhat better, but still feels strange "in a dream like state"  Her examination today reveals positive Romberg sign, otherwise negative  Although it is possible/likely that her symptoms are due to peripheral vertigo, I am concerned regarding the persistence of the symptoms as well as episode of slurred speech  Will sent for MRI of brain w IACs, as well as carotid ultrasound  If negative, consider referral to ENT if vertigo persists    Continue meclizine as needed

## 2018-05-01 NOTE — PROGRESS NOTES
Pt is scheduled for a routine VAS Carotid complete study on 05/07/18 at 4:45 pm at [facility]

## 2018-05-01 NOTE — PROGRESS NOTES
Assessment/Plan:    Near syncope  This is a follow up from ER visit at [facility] from 4/29  Pt was seen due to episode of dizziness (vertigo), slurred speech, chest pain  Pt was brought to ER via ambulance  ER ruled out MI (EKG/labs)  She was given rx for meclizine and subsequently d/c w/ instructions to f/u w/ her PCP  Currently, she is feeling somewhat better, but still feels strange "in a dream like state"  Her examination today reveals positive Romberg sign, otherwise negative  Although it is possible/likely that her symptoms are due to peripheral vertigo, I am concerned regarding the persistence of the symptoms as well as episode of slurred speech  Will sent for MRI of brain w IACs, as well as carotid ultrasound  If negative, consider referral to ENT if vertigo persists  Continue meclizine as needed    Slurred speech  See  "near syncope"    Vertigo  As above  Continue meclizine prn  If symptoms persist, will refer to ENT       Diagnoses and all orders for this visit:    Near syncope  -     MRI brain IAC wo contrast; Future  -     VAS carotid complete study; Future    Slurred speech  -     MRI brain IAC wo contrast; Future  -     VAS carotid complete study; Future    Vertigo  -     MRI brain IAC wo contrast; Future    Other orders  -     albuterol (PROVENTIL HFA,VENTOLIN HFA) 90 mcg/act inhaler; Inhale 2 puffs every 4 (four) hours          Subjective:      Patient ID: Choco Green is a 51 y o  female  This is a follow up from ER visit at [facility] from 4/29  Pt was seen due to episode of dizziness (vertigo), slurred speech, chest pain  Pt was brought to ER via ambulance  ER ruled out MI (EKG/labs)  She was given rx for meclizine and subsequently d/c w/ instructions to f/u w/ her PCP  Currently, she is feeling somewhat better, but still feels strange "in a dream like state"           The following portions of the patient's history were reviewed and updated as appropriate: allergies, current medications, past family history, past medical history, past social history, past surgical history and problem list     Review of Systems   Constitutional: Negative for chills and fever  HENT: Negative  Respiratory: Negative  Cardiovascular: Negative  Negative for palpitations  Some chest pains   Neurological: Positive for dizziness, speech difficulty and light-headedness           Objective:      /82   Pulse 100   Temp 98 6 °F (37 °C) (Tympanic)   Resp 16   LMP 04/02/2018 (Approximate)   SpO2 97%          Physical Exam

## 2018-05-02 ENCOUNTER — DOCUMENTATION (OUTPATIENT)
Dept: FAMILY MEDICINE CLINIC | Facility: CLINIC | Age: 48
End: 2018-05-02

## 2018-05-03 NOTE — PROGRESS NOTES
Faxed order to [facility], fax # [phone], per pt request   Nick Davey # also sent

## 2018-05-07 ENCOUNTER — HOSPITAL ENCOUNTER (OUTPATIENT)
Dept: NON INVASIVE DIAGNOSTICS | Facility: HOSPITAL | Age: 48
Discharge: HOME | End: 2018-05-07
Payer: COMMERCIAL

## 2018-05-07 DIAGNOSIS — R55 NEAR SYNCOPE: ICD-10-CM

## 2018-05-07 DIAGNOSIS — R47.81 SLURRED SPEECH: ICD-10-CM

## 2018-05-07 PROCEDURE — 93880 EXTRACRANIAL BILAT STUDY: CPT

## 2018-05-07 PROCEDURE — 93880 EXTRACRANIAL BILAT STUDY: CPT | Performed by: SURGERY

## 2018-05-14 ENCOUNTER — TELEPHONE (OUTPATIENT)
Dept: FAMILY MEDICINE CLINIC | Facility: CLINIC | Age: 48
End: 2018-05-14

## 2018-05-14 NOTE — TELEPHONE ENCOUNTER
Pt called into the office requesting the results of her MRI  The results are in care everywhere  Than you!

## 2018-05-14 NOTE — TELEPHONE ENCOUNTER
Gamboa Cordon called the office she is waiting for her MRI results she is very nervous  I informed pt of her vas results  I informed pt that you are out of the office tomorrow and will be in on 5/16/18 and pt stated, " I will expect a call first thing in the morning"  I informed pt that I would send Dr Montero that message

## 2018-05-15 NOTE — TELEPHONE ENCOUNTER
I spoke w/ pt  The MRI of her brain (along w/ carotid US) were neg  Pt states that she still has some dizziness and  episodes that she feels like she's in a "dream state"  rec: continue meclizine prn   Will also refer to ENT for eval (#given) Other/IV hydration, po challenge, observation and reassessment.

## 2018-07-16 ENCOUNTER — OFFICE VISIT (OUTPATIENT)
Dept: FAMILY MEDICINE CLINIC | Facility: CLINIC | Age: 48
End: 2018-07-16
Payer: COMMERCIAL

## 2018-07-16 VITALS
BODY MASS INDEX: 30.2 KG/M2 | SYSTOLIC BLOOD PRESSURE: 138 MMHG | TEMPERATURE: 97.9 F | DIASTOLIC BLOOD PRESSURE: 78 MMHG | OXYGEN SATURATION: 97 % | HEIGHT: 68 IN | HEART RATE: 79 BPM | WEIGHT: 199.3 LBS | RESPIRATION RATE: 17 BRPM

## 2018-07-16 DIAGNOSIS — E78.1 HYPERTRIGLYCERIDEMIA: ICD-10-CM

## 2018-07-16 DIAGNOSIS — E55.9 VITAMIN D DEFICIENCY: ICD-10-CM

## 2018-07-16 DIAGNOSIS — G60.9 IDIOPATHIC PERIPHERAL NEUROPATHY: Primary | ICD-10-CM

## 2018-07-16 DIAGNOSIS — R53.83 FATIGUE, UNSPECIFIED TYPE: ICD-10-CM

## 2018-07-16 PROBLEM — M94.0 ACUTE COSTOCHONDRITIS: Status: ACTIVE | Noted: 2017-04-13

## 2018-07-16 PROBLEM — M54.12 CERVICAL RADICULOPATHY AT C6: Status: ACTIVE | Noted: 2017-10-18

## 2018-07-16 PROBLEM — I10 BENIGN ESSENTIAL HYPERTENSION: Status: ACTIVE | Noted: 2017-09-13

## 2018-07-16 PROBLEM — E05.00 GRAVES' DISEASE: Status: ACTIVE | Noted: 2018-01-18

## 2018-07-16 PROBLEM — R07.89 ATYPICAL CHEST PAIN: Status: ACTIVE | Noted: 2017-04-13

## 2018-07-16 PROBLEM — R05.9 COUGH: Status: ACTIVE | Noted: 2017-03-08

## 2018-07-16 PROBLEM — E05.90 HYPERTHYROIDISM: Status: ACTIVE | Noted: 2017-02-28

## 2018-07-16 PROBLEM — M70.60 TROCHANTERIC BURSITIS: Status: ACTIVE | Noted: 2017-11-28

## 2018-07-16 PROBLEM — M16.9 OSTEOARTHRITIS OF HIP: Status: ACTIVE | Noted: 2017-11-28

## 2018-07-16 PROCEDURE — 99214 OFFICE O/P EST MOD 30 MIN: CPT | Performed by: PHYSICIAN ASSISTANT

## 2018-07-16 RX ORDER — PREDNISONE 20 MG/1
20 TABLET ORAL AS NEEDED
COMMUNITY
Start: 2017-11-22 | End: 2022-02-28

## 2018-07-16 RX ORDER — ALBUTEROL SULFATE 90 UG/1
90 AEROSOL, METERED RESPIRATORY (INHALATION) AS NEEDED
COMMUNITY
Start: 2017-02-23 | End: 2019-11-29 | Stop reason: SDUPTHER

## 2018-07-16 RX ORDER — GABAPENTIN 100 MG/1
100 CAPSULE ORAL 3 TIMES DAILY
Qty: 90 CAPSULE | Refills: 0 | Status: SHIPPED | OUTPATIENT
Start: 2018-07-16 | End: 2018-07-27 | Stop reason: SDUPTHER

## 2018-07-16 NOTE — PROGRESS NOTES
Assessment/Plan:         Diagnoses and all orders for this visit:    Idiopathic peripheral neuropathy  -     gabapentin (NEURONTIN) 100 mg capsule; Take 1 capsule (100 mg total) by mouth 3 (three) times a day  -     CBC and differential; Future  -     Comprehensive metabolic panel; Future  -     Lipid panel; Future  -     Vitamin D 25 hydroxy; Future  -     XR spine lumbar minimum 4 views non injury; Future  -     Vitamin B12; Future  -     Folate; Future  -     Heavy metals screen, urine  -     Lyme Antibody Profile with reflex to WB; Future  -     Sedimentation rate, automated; Future  -     C-reactive protein; Future  -     RF Screen w/ Reflex to Titer; Future  -     JOSE Screen w/ Reflex to Titer/Pattern; Future    Fatigue, unspecified type  -     CBC and differential; Future  -     Comprehensive metabolic panel; Future  -     Lipid panel; Future  -     Vitamin D 25 hydroxy; Future  -     XR spine lumbar minimum 4 views non injury; Future  -     Vitamin B12; Future  -     Folate; Future  -     Heavy metals screen, urine  -     Lyme Antibody Profile with reflex to WB; Future  -     Sedimentation rate, automated; Future  -     C-reactive protein; Future  -     RF Screen w/ Reflex to Titer; Future  -     JOSE Screen w/ Reflex to Titer/Pattern; Future    Hypertriglyceridemia  -     CBC and differential; Future  -     Comprehensive metabolic panel; Future  -     Lipid panel; Future  -     Vitamin D 25 hydroxy; Future  -     XR spine lumbar minimum 4 views non injury; Future  -     Vitamin B12; Future  -     Folate; Future  -     Heavy metals screen, urine  -     Lyme Antibody Profile with reflex to WB; Future  -     Sedimentation rate, automated; Future  -     C-reactive protein; Future  -     RF Screen w/ Reflex to Titer; Future  -     JOSE Screen w/ Reflex to Titer/Pattern; Future    Vitamin D deficiency  -     CBC and differential; Future  -     Comprehensive metabolic panel; Future  -     Lipid panel;  Future  - Vitamin D 25 hydroxy; Future  -     XR spine lumbar minimum 4 views non injury; Future  -     Vitamin B12; Future  -     Folate; Future  -     Heavy metals screen, urine  -     Lyme Antibody Profile with reflex to WB; Future  -     Sedimentation rate, automated; Future  -     C-reactive protein; Future  -     RF Screen w/ Reflex to Titer; Future  -     JOSE Screen w/ Reflex to Titer/Pattern; Future    Other orders  -     predniSONE 20 mg tablet; Take 20 mg by mouth as needed  -     albuterol (VENTOLIN HFA) 90 mcg/act inhaler; Apply 90 mcg to the mouth or throat as needed        Discussed pt's symptoms with her in detail  Strongly resembles a peripheral neuropathy that at this time is idiopathic in nature  Does not resemble restless leg syndrome  Will begin workup with combination of labs as well as an X-ray of the lumbar spine  She had similar symptoms in the past that were evaluated with EMG a few years ago but nothing definitively was found and the symptoms resolved spontaneously  I will also give her trial of Gabapentin 100 mg titrating up to TID to help the symptoms  She is to call in a few weeks if no significant relief and we can consider further titration upward with the dosage  Chief Complaint   Patient presents with    Foot Pain     Burning, Tingling, & Sharp pain Bilateral x1wk  Subjective:     Patient ID: Chriss Freitas is a 51 y o  female  Pt presents with 1 week history of sharp, burning, tingling pain in her lower extremities from the knees down  She reports it started out of nowhere and she cannot get in a comfortable position  She denies any back trouble, injury/trauma  Tried taking OTC medications for restless legs which did not help but it is more of a pain than a restlessness  She denies her legs giving out on her when she walks  She is not diabetic           The following portions of the patient's history were reviewed and updated as appropriate:   She  has a past medical history of Acute costochondritis (04/13/2017); Allergic rhinitis due to pollen (09/08/2016); Atypical chest pain (04/13/2017); Contact dermatitis due to adhesives (09/08/2016); Dog bite, hand (03/27/2015); Paresthesia of left lower extremity (06/06/2016); and Recurrent cold sores (12/03/2015)  She   Patient Active Problem List    Diagnosis Date Noted    Near syncope 05/01/2018    Slurred speech 05/01/2018    Vertigo 05/01/2018    Fatigue 01/18/2018    Graves' disease 01/18/2018    Hypertriglyceridemia 01/18/2018    Vitamin D deficiency 01/18/2018    Osteoarthritis of hip 11/28/2017    Trochanteric bursitis 11/28/2017    Cervical radiculopathy at C6 10/18/2017    Benign essential hypertension 09/13/2017    Acute costochondritis 04/13/2017    Atypical chest pain 04/13/2017    Cough 03/08/2017    Hyperthyroidism 02/28/2017    Shoulder pain 12/07/2016    Contact dermatitis due to adhesives 09/08/2016    Neck pain 06/06/2016    Paresthesia of left lower extremity 06/06/2016    Recurrent cold sores 12/03/2015    Acute hip pain, right 11/12/2015    Anxiety 11/12/2015    Bilateral low back pain with right-sided sciatica 11/12/2015    Depression 11/12/2015    Other dyspnea and respiratory abnormality 10/06/2014    Allergic rhinitis due to pollen 08/20/2012     She  has a past surgical history that includes Nasal septum surgery (04/01/2009)  Her family history includes Alcohol abuse in her father; Allergic rhinitis in her family; COPD in her father; Colon cancer in her mother; Coronary artery disease in her family; Depression in her father and sister; Diabetes in her family; Hypertension in her family  She  reports that she has never smoked  She has never used smokeless tobacco  She reports that she drinks alcohol  She reports that she does not use drugs    Current Outpatient Prescriptions   Medication Sig Dispense Refill    albuterol (VENTOLIN HFA) 90 mcg/act inhaler Apply 90 mcg to the mouth or throat as needed      ALPRAZolam (XANAX) 0 5 mg tablet Take by mouth 2 (two) times a day as needed      calcium-vitamin D (OSCAL 500 + D) 500 mg-200 units per tablet Take 1 tablet by mouth      diclofenac sodium (VOLTAREN) 50 mg EC tablet Take 50 mg by mouth 2 (two) times a day  0    EPINEPHrine (EPIPEN 2-RYAN) 0 3 mg/0 3 mL SOAJ Inject 0 3 mL as directed      fexofenadine (ALLEGRA ALLERGY) 180 MG tablet Daily      Flaxseed, Linseed, (FLAXSEED OIL) 1000 MG CAPS Take 1 capsule by mouth      lisinopril (ZESTRIL) 10 mg tablet Take 1 tablet by mouth daily      meclizine (ANTIVERT) 25 mg tablet 25 mg every 8 (eight) hours as needed        methimazole (TAPAZOLE) 5 mg tablet Take 1 tablet by mouth daily        methocarbamol (ROBAXIN) 750 mg tablet Take 1 tablet (750 mg total) by mouth 3 (three) times a day 120 tablet 1    Multiple Vitamins-Minerals (MULTIVITAMIN ADULT EXTRA C PO) one daily      Norgestim-Eth Estrad Triphasic (NORGESTIMATE-ETHINYL ESTRADIOL PO) Take by mouth      norgestimate-ethinyl estradiol (SPRINTEC 28) 0 25-35 MG-MCG per tablet Take 1 tablet by mouth      predniSONE 20 mg tablet Take 20 mg by mouth as needed      valACYclovir (VALTREX) 1,000 mg tablet Take by mouth      vilazodone (VIIBRYD) 10 mg tablet Take 1 tablet (10 mg total) by mouth daily 90 tablet 1    bupivacaine (SENSORCAINE) 0 25 % 1 mL      bupivacaine 0 5% and lidocaine 2% (1:1)10 ml with dexamethasone 4mg 1 mL      Calcium 500-100 MG-UNIT CHEW Calcium 500      cyclobenzaprine (AMRIX) 15 MG 24 hr capsule Take 1 capsule by mouth      gabapentin (NEURONTIN) 100 mg capsule Take 1 capsule (100 mg total) by mouth 3 (three) times a day 90 capsule 0     No current facility-administered medications for this visit        Current Outpatient Prescriptions on File Prior to Visit   Medication Sig    ALPRAZolam (XANAX) 0 5 mg tablet Take by mouth 2 (two) times a day as needed    calcium-vitamin D (OSCAL 500 + D) 500 mg-200 units per tablet Take 1 tablet by mouth    diclofenac sodium (VOLTAREN) 50 mg EC tablet Take 50 mg by mouth 2 (two) times a day    EPINEPHrine (EPIPEN 2-RYAN) 0 3 mg/0 3 mL SOAJ Inject 0 3 mL as directed    fexofenadine (ALLEGRA ALLERGY) 180 MG tablet Daily    Flaxseed, Linseed, (FLAXSEED OIL) 1000 MG CAPS Take 1 capsule by mouth    lisinopril (ZESTRIL) 10 mg tablet Take 1 tablet by mouth daily    meclizine (ANTIVERT) 25 mg tablet 25 mg every 8 (eight) hours as needed      methimazole (TAPAZOLE) 5 mg tablet Take 1 tablet by mouth daily      methocarbamol (ROBAXIN) 750 mg tablet Take 1 tablet (750 mg total) by mouth 3 (three) times a day    Multiple Vitamins-Minerals (MULTIVITAMIN ADULT EXTRA C PO) one daily    Norgestim-Eth Estrad Triphasic (NORGESTIMATE-ETHINYL ESTRADIOL PO) Take by mouth    norgestimate-ethinyl estradiol (SPRINTEC 28) 0 25-35 MG-MCG per tablet Take 1 tablet by mouth    valACYclovir (VALTREX) 1,000 mg tablet Take by mouth    vilazodone (VIIBRYD) 10 mg tablet Take 1 tablet (10 mg total) by mouth daily    bupivacaine (SENSORCAINE) 0 25 % 1 mL    bupivacaine 0 5% and lidocaine 2% (1:1)10 ml with dexamethasone 4mg 1 mL    Calcium 500-100 MG-UNIT CHEW Calcium 500    cyclobenzaprine (AMRIX) 15 MG 24 hr capsule Take 1 capsule by mouth     No current facility-administered medications on file prior to visit  She is allergic to albumen, egg; dust mite extract; eggs or egg-derived products; fish allergy; fish oil; fish-derived products; molds & smuts; other; pollen extract; and sertraline       Review of Systems   Constitutional: Negative  Respiratory: Negative  Cardiovascular: Negative  Gastrointestinal: Negative  Genitourinary: Negative  Musculoskeletal:        B/L LE pain from knees distally    Neurological: Positive for numbness (Paresthesias both lower extremities)           Objective:      /78 (BP Location: Left arm, Patient Position: Sitting, Cuff Size: Large)   Pulse 79   Temp 97 9 °F (36 6 °C) (Tympanic)   Resp 17   Ht 5' 8" (1 727 m)   Wt 90 4 kg (199 lb 4 8 oz)   SpO2 97%   BMI 30 30 kg/m²          Physical Exam   Constitutional: She is oriented to person, place, and time  She appears well-developed and well-nourished  No distress  Musculoskeletal: Normal range of motion  Neurological: She is alert and oriented to person, place, and time  She has normal strength and normal reflexes  No sensory deficit  Coordination normal    Psychiatric: She has a normal mood and affect  Vitals reviewed

## 2018-07-20 ENCOUNTER — TRANSCRIBE ORDERS (OUTPATIENT)
Dept: URGENT CARE | Facility: MEDICAL CENTER | Age: 48
End: 2018-07-20

## 2018-07-20 ENCOUNTER — APPOINTMENT (OUTPATIENT)
Dept: RADIOLOGY | Facility: MEDICAL CENTER | Age: 48
End: 2018-07-20
Payer: COMMERCIAL

## 2018-07-20 DIAGNOSIS — E55.9 VITAMIN D DEFICIENCY: ICD-10-CM

## 2018-07-20 DIAGNOSIS — E78.1 HYPERTRIGLYCERIDEMIA: ICD-10-CM

## 2018-07-20 DIAGNOSIS — G60.9 IDIOPATHIC PERIPHERAL NEUROPATHY: ICD-10-CM

## 2018-07-20 DIAGNOSIS — R53.83 FATIGUE, UNSPECIFIED TYPE: ICD-10-CM

## 2018-07-20 PROCEDURE — 72110 X-RAY EXAM L-2 SPINE 4/>VWS: CPT

## 2018-07-27 DIAGNOSIS — G60.9 IDIOPATHIC PERIPHERAL NEUROPATHY: ICD-10-CM

## 2018-07-27 DIAGNOSIS — R76.8 POSITIVE ANA (ANTINUCLEAR ANTIBODY): Primary | ICD-10-CM

## 2018-07-27 DIAGNOSIS — I10 BENIGN ESSENTIAL HYPERTENSION: Primary | ICD-10-CM

## 2018-07-27 RX ORDER — LISINOPRIL 10 MG/1
10 TABLET ORAL DAILY
Qty: 90 TABLET | Refills: 1 | Status: SHIPPED | OUTPATIENT
Start: 2018-07-27 | End: 2018-10-01 | Stop reason: SDUPTHER

## 2018-07-27 RX ORDER — GABAPENTIN 300 MG/1
300 CAPSULE ORAL 3 TIMES DAILY
Qty: 90 CAPSULE | Refills: 0 | Status: SHIPPED | OUTPATIENT
Start: 2018-07-27 | End: 2018-10-01 | Stop reason: SDUPTHER

## 2018-07-28 DIAGNOSIS — F32.A DEPRESSION, UNSPECIFIED DEPRESSION TYPE: ICD-10-CM

## 2018-07-28 DIAGNOSIS — M54.9 BACK PAIN, UNSPECIFIED BACK LOCATION, UNSPECIFIED BACK PAIN LATERALITY, UNSPECIFIED CHRONICITY: ICD-10-CM

## 2018-07-28 RX ORDER — METHOCARBAMOL 750 MG/1
750 TABLET, FILM COATED ORAL 3 TIMES DAILY
Qty: 120 TABLET | Refills: 0 | Status: SHIPPED | OUTPATIENT
Start: 2018-07-28 | End: 2019-05-13 | Stop reason: SDUPTHER

## 2018-07-28 RX ORDER — VILAZODONE HYDROCHLORIDE 10 MG/1
10 TABLET ORAL DAILY
Qty: 90 TABLET | Refills: 0 | Status: SHIPPED | OUTPATIENT
Start: 2018-07-28 | End: 2019-02-07 | Stop reason: ALTCHOICE

## 2018-10-01 DIAGNOSIS — I10 BENIGN ESSENTIAL HYPERTENSION: ICD-10-CM

## 2018-10-01 DIAGNOSIS — G60.9 IDIOPATHIC PERIPHERAL NEUROPATHY: ICD-10-CM

## 2018-10-01 RX ORDER — GABAPENTIN 300 MG/1
300 CAPSULE ORAL 3 TIMES DAILY
Qty: 90 CAPSULE | Refills: 0 | Status: SHIPPED | OUTPATIENT
Start: 2018-10-01 | End: 2019-05-13 | Stop reason: SDUPTHER

## 2018-10-01 RX ORDER — LISINOPRIL 10 MG/1
10 TABLET ORAL DAILY
Qty: 90 TABLET | Refills: 1 | Status: SHIPPED | OUTPATIENT
Start: 2018-10-01 | End: 2019-03-31 | Stop reason: SDUPTHER

## 2018-12-12 DIAGNOSIS — R42 VERTIGO: ICD-10-CM

## 2018-12-12 DIAGNOSIS — R47.81 SLURRED SPEECH: ICD-10-CM

## 2018-12-12 DIAGNOSIS — R55 NEAR SYNCOPE: ICD-10-CM

## 2019-02-07 ENCOUNTER — OFFICE VISIT (OUTPATIENT)
Dept: FAMILY MEDICINE CLINIC | Facility: CLINIC | Age: 49
End: 2019-02-07
Payer: COMMERCIAL

## 2019-02-07 VITALS
SYSTOLIC BLOOD PRESSURE: 130 MMHG | RESPIRATION RATE: 16 BRPM | BODY MASS INDEX: 28.19 KG/M2 | HEART RATE: 88 BPM | DIASTOLIC BLOOD PRESSURE: 90 MMHG | OXYGEN SATURATION: 98 % | HEIGHT: 68 IN | TEMPERATURE: 99.4 F | WEIGHT: 186 LBS

## 2019-02-07 DIAGNOSIS — B00.1 HERPES LABIALIS: ICD-10-CM

## 2019-02-07 DIAGNOSIS — J01.90 ACUTE SINUSITIS, RECURRENCE NOT SPECIFIED, UNSPECIFIED LOCATION: Primary | ICD-10-CM

## 2019-02-07 PROCEDURE — 1036F TOBACCO NON-USER: CPT | Performed by: NURSE PRACTITIONER

## 2019-02-07 PROCEDURE — 99213 OFFICE O/P EST LOW 20 MIN: CPT | Performed by: NURSE PRACTITIONER

## 2019-02-07 PROCEDURE — 3008F BODY MASS INDEX DOCD: CPT | Performed by: NURSE PRACTITIONER

## 2019-02-07 RX ORDER — HYDROXYCHLOROQUINE SULFATE 200 MG/1
200 TABLET, FILM COATED ORAL 2 TIMES DAILY WITH MEALS
COMMUNITY
Start: 2018-12-20

## 2019-02-07 RX ORDER — VALACYCLOVIR HYDROCHLORIDE 1 G/1
1000 TABLET, FILM COATED ORAL 2 TIMES DAILY
Qty: 2 TABLET | Refills: 0 | Status: SHIPPED | OUTPATIENT
Start: 2019-02-07 | End: 2019-05-13 | Stop reason: SDUPTHER

## 2019-02-07 RX ORDER — AMOXICILLIN AND CLAVULANATE POTASSIUM 875; 125 MG/1; MG/1
1 TABLET, FILM COATED ORAL EVERY 12 HOURS SCHEDULED
Qty: 14 TABLET | Refills: 0 | Status: SHIPPED | OUTPATIENT
Start: 2019-02-07 | End: 2019-02-14

## 2019-02-07 RX ORDER — HYDROXYCHLOROQUINE SULFATE 200 MG/1
200 TABLET, FILM COATED ORAL
COMMUNITY
End: 2019-05-13

## 2019-02-07 NOTE — PROGRESS NOTES
[facility]    ASSESSMENT AND PLAN     1  Acute sinusitis, recurrence not specified, unspecified location  19-year-old female presents today with signs and symptoms of an acute sinusitis  Physical assessment is as documented below  Rx given today for a 7 day course of Augmentin  Symptom management reviewed: Increased rest, increase fluids may take OTC antitussive/expectorant if needed  She is to be re-evaluated if her symptoms change, persist and/or worsen  - amoxicillin-clavulanate (AUGMENTIN) 875-125 mg per tablet; Take 1 tablet by mouth every 12 (twelve) hours for 7 days  Dispense: 14 tablet; Refill: 0    2  Herpes labialis  Patient with a herpetic lesion corner of lower lip on the right side  It just started erupting yesterday  Rx for 1 time dose Valtrex  - valACYclovir (VALTREX) 1,000 mg tablet; Take 1 tablet (1,000 mg total) by mouth 2 (two) times a day for 1 day  Dispense: 2 tablet; Refill: 0            SUBJECTIVE       Patient ID: Carroll Deleon is a 51 y o  female  Chief Complaint   Patient presents with    Cold Like Symptoms     congested 3 days       HISTORY OF PRESENT ILLNESS    Patient presents today with 3 day history of sinus pain and congestion  She has a lot of pressure in her face/cheeks  She is blowing out large amounts of thick secretions from her nose and coughing up it up as well  She denies any respiratory symptoms other than the mucus  Denies fever  Denies any GI distress  The following portions of the patient's history were reviewed and updated as appropriate: allergies, current medications, past medical history and problem list     REVIEW OF SYSTEMS  Review of Systems   Constitutional: Negative  HENT: Positive for congestion, mouth sores (Corner right lower lip), postnasal drip, sinus pain, sinus pressure and sore throat  Negative for ear pain  Respiratory: Negative  Cardiovascular: Negative  Gastrointestinal: Negative      Neurological: Negative  Psychiatric/Behavioral: Negative  OBJECTIVE      VITAL SIGNS  /90 (BP Location: Left arm, Patient Position: Sitting, Cuff Size: Adult)   Pulse 88   Temp 99 4 °F (37 4 °C) (Tympanic)   Resp 16   Ht 5' 7 72" (1 72 m)   Wt 84 4 kg (186 lb)   LMP 02/07/2019   SpO2 98%   BMI 28 52 kg/m²       PHYSICAL EXAMINATION   Physical Exam   Constitutional: She is oriented to person, place, and time  Vital signs are normal  She appears well-developed and well-nourished  HENT:   Head: Normocephalic  Right Ear: Hearing, tympanic membrane, external ear and ear canal normal  No middle ear effusion  Left Ear: Hearing, tympanic membrane, external ear and ear canal normal   No middle ear effusion  Nose: Mucosal edema present  Right sinus exhibits maxillary sinus tenderness  Right sinus exhibits no frontal sinus tenderness  Left sinus exhibits maxillary sinus tenderness  Left sinus exhibits no frontal sinus tenderness  Mouth/Throat: Uvula is midline  Mucous membranes are dry  Oropharyngeal exudate present  No posterior oropharyngeal erythema  Eyes: Conjunctivae are normal  Right eye exhibits no discharge  Left eye exhibits no discharge  Cardiovascular: Normal rate and regular rhythm  Pulmonary/Chest: Effort normal and breath sounds normal  No respiratory distress  She has no decreased breath sounds  She has no wheezes  Musculoskeletal: Normal range of motion  Lymphadenopathy:        Head (right side): Submandibular (Tender) adenopathy present  No submental, no tonsillar, no preauricular, no posterior auricular and no occipital adenopathy present  Head (left side): Submandibular (Tender) adenopathy present  No submental, no tonsillar, no preauricular, no posterior auricular and no occipital adenopathy present  She has no cervical adenopathy  Neurological: She is alert and oriented to person, place, and time  Skin: Skin is warm, dry and intact     Psychiatric: She has a normal mood and affect  Her speech is normal and behavior is normal  Judgment and thought content normal  Cognition and memory are normal    Nursing note and vitals reviewed

## 2019-03-31 DIAGNOSIS — I10 BENIGN ESSENTIAL HYPERTENSION: ICD-10-CM

## 2019-03-31 RX ORDER — LISINOPRIL 10 MG/1
TABLET ORAL
Qty: 90 TABLET | Refills: 1 | Status: SHIPPED | OUTPATIENT
Start: 2019-03-31 | End: 2019-09-27 | Stop reason: SDUPTHER

## 2019-04-23 ENCOUNTER — RX ONLY (RX ONLY)
Age: 49
End: 2019-04-23

## 2019-04-23 ENCOUNTER — DOCTOR'S OFFICE (OUTPATIENT)
Dept: URBAN - METROPOLITAN AREA CLINIC 136 | Facility: CLINIC | Age: 49
Setting detail: OPHTHALMOLOGY
End: 2019-04-23
Payer: COMMERCIAL

## 2019-04-23 DIAGNOSIS — E05.00: ICD-10-CM

## 2019-04-23 DIAGNOSIS — Z79.891: ICD-10-CM

## 2019-04-23 PROCEDURE — 92004 COMPRE OPH EXAM NEW PT 1/>: CPT | Performed by: OPTOMETRIST

## 2019-04-23 PROCEDURE — 92134 CPTRZ OPH DX IMG PST SGM RTA: CPT | Performed by: OPTOMETRIST

## 2019-04-23 ASSESSMENT — VISUAL ACUITY
OS_BCVA: 20/30-1
OD_BCVA: 20/40-1

## 2019-04-23 ASSESSMENT — REFRACTION_CURRENTRX
OD_OVR_VA: 20/
OS_OVR_VA: 20/

## 2019-04-23 ASSESSMENT — REFRACTION_MANIFEST
OS_VA2: 20/
OS_VA2: 20/
OS_VA1: 20/
OU_VA: 20/
OD_VA2: 20/
OD_VA3: 20/
OD_VA2: 20/
OD_VA1: 20/
OS_VA3: 20/
OS_VA1: 20/
OS_VA3: 20/
OU_VA: 20/
OD_VA3: 20/
OD_VA1: 20/

## 2019-04-23 ASSESSMENT — TEAR BREAK UP TIME (TBUT)
OD_TBUT: 4-5 SEC
OS_TBUT: 4-5 SEC

## 2019-04-23 ASSESSMENT — CONFRONTATIONAL VISUAL FIELD TEST (CVF)
OD_FINDINGS: FULL
OS_FINDINGS: FULL

## 2019-05-03 ENCOUNTER — DOCTOR'S OFFICE (OUTPATIENT)
Dept: URBAN - METROPOLITAN AREA CLINIC 136 | Facility: CLINIC | Age: 49
Setting detail: OPHTHALMOLOGY
End: 2019-05-03
Payer: COMMERCIAL

## 2019-05-03 DIAGNOSIS — Z79.891: ICD-10-CM

## 2019-05-03 PROCEDURE — 92083 EXTENDED VISUAL FIELD XM: CPT | Performed by: OPTOMETRIST

## 2019-05-13 ENCOUNTER — OFFICE VISIT (OUTPATIENT)
Dept: FAMILY MEDICINE CLINIC | Facility: CLINIC | Age: 49
End: 2019-05-13
Payer: COMMERCIAL

## 2019-05-13 VITALS
SYSTOLIC BLOOD PRESSURE: 110 MMHG | RESPIRATION RATE: 17 BRPM | TEMPERATURE: 98 F | WEIGHT: 186.5 LBS | HEART RATE: 78 BPM | BODY MASS INDEX: 27.62 KG/M2 | DIASTOLIC BLOOD PRESSURE: 70 MMHG | HEIGHT: 69 IN | OXYGEN SATURATION: 98 %

## 2019-05-13 DIAGNOSIS — M54.9 BACK PAIN, UNSPECIFIED BACK LOCATION, UNSPECIFIED BACK PAIN LATERALITY, UNSPECIFIED CHRONICITY: ICD-10-CM

## 2019-05-13 DIAGNOSIS — E05.00 GRAVES' DISEASE: ICD-10-CM

## 2019-05-13 DIAGNOSIS — Z12.11 SCREENING FOR COLON CANCER: Primary | ICD-10-CM

## 2019-05-13 DIAGNOSIS — M54.12 CERVICAL RADICULOPATHY AT C6: ICD-10-CM

## 2019-05-13 DIAGNOSIS — B00.1 RECURRENT COLD SORES: ICD-10-CM

## 2019-05-13 DIAGNOSIS — B00.1 HERPES LABIALIS: ICD-10-CM

## 2019-05-13 DIAGNOSIS — Z13.220 SCREENING FOR CHOLESTEROL LEVEL: ICD-10-CM

## 2019-05-13 DIAGNOSIS — G60.9 IDIOPATHIC PERIPHERAL NEUROPATHY: ICD-10-CM

## 2019-05-13 DIAGNOSIS — I10 BENIGN ESSENTIAL HYPERTENSION: ICD-10-CM

## 2019-05-13 DIAGNOSIS — M19.90 ARTHRITIS: ICD-10-CM

## 2019-05-13 DIAGNOSIS — T78.40XD ALLERGIC REACTION, SUBSEQUENT ENCOUNTER: ICD-10-CM

## 2019-05-13 DIAGNOSIS — F41.9 ANXIETY: ICD-10-CM

## 2019-05-13 DIAGNOSIS — Z12.31 ENCOUNTER FOR SCREENING MAMMOGRAM FOR BREAST CANCER: ICD-10-CM

## 2019-05-13 DIAGNOSIS — R20.2 PARESTHESIA OF LEFT LOWER EXTREMITY: ICD-10-CM

## 2019-05-13 PROCEDURE — 3074F SYST BP LT 130 MM HG: CPT | Performed by: FAMILY MEDICINE

## 2019-05-13 PROCEDURE — 3078F DIAST BP <80 MM HG: CPT | Performed by: FAMILY MEDICINE

## 2019-05-13 PROCEDURE — 3008F BODY MASS INDEX DOCD: CPT | Performed by: FAMILY MEDICINE

## 2019-05-13 PROCEDURE — 99214 OFFICE O/P EST MOD 30 MIN: CPT | Performed by: FAMILY MEDICINE

## 2019-05-13 RX ORDER — METHOCARBAMOL 750 MG/1
750 TABLET, FILM COATED ORAL 3 TIMES DAILY
Qty: 360 TABLET | Refills: 1 | Status: SHIPPED | OUTPATIENT
Start: 2019-05-13 | End: 2019-11-29 | Stop reason: SDUPTHER

## 2019-05-13 RX ORDER — EPINEPHRINE 0.3 MG/.3ML
0.3 INJECTION SUBCUTANEOUS ONCE
Qty: 0.6 ML | Refills: 3 | Status: SHIPPED | OUTPATIENT
Start: 2019-05-13 | End: 2019-05-15

## 2019-05-13 RX ORDER — CLOBETASOL PROPIONATE 0.5 MG/G
OINTMENT TOPICAL AS NEEDED
Refills: 3 | COMMUNITY
Start: 2019-05-06

## 2019-05-13 RX ORDER — GABAPENTIN 300 MG/1
300 CAPSULE ORAL 3 TIMES DAILY
Qty: 270 CAPSULE | Refills: 1 | Status: SHIPPED | OUTPATIENT
Start: 2019-05-13 | End: 2019-11-29

## 2019-05-13 RX ORDER — VALACYCLOVIR HYDROCHLORIDE 1 G/1
1000 TABLET, FILM COATED ORAL 2 TIMES DAILY
Qty: 12 TABLET | Refills: 3 | Status: SHIPPED | OUTPATIENT
Start: 2019-05-13 | End: 2020-07-19

## 2019-05-13 RX ORDER — ALPRAZOLAM 0.5 MG/1
0.5 TABLET ORAL 2 TIMES DAILY PRN
Qty: 30 TABLET | Refills: 0 | Status: SHIPPED | OUTPATIENT
Start: 2019-05-13 | End: 2020-12-14

## 2019-05-15 ENCOUNTER — TELEPHONE (OUTPATIENT)
Dept: FAMILY MEDICINE CLINIC | Facility: CLINIC | Age: 49
End: 2019-05-15

## 2019-05-15 DIAGNOSIS — F41.9 ANXIETY: Primary | ICD-10-CM

## 2019-05-15 RX ORDER — EPINEPHRINE 0.3 MG/.3ML
0.3 INJECTION SUBCUTANEOUS ONCE
Qty: 0.6 ML | Refills: 2 | Status: SHIPPED | OUTPATIENT
Start: 2019-05-15 | End: 2021-08-26 | Stop reason: SDUPTHER

## 2019-05-22 ENCOUNTER — APPOINTMENT (OUTPATIENT)
Dept: LAB | Facility: CLINIC | Age: 49
End: 2019-05-22
Payer: COMMERCIAL

## 2019-05-22 DIAGNOSIS — I10 BENIGN ESSENTIAL HYPERTENSION: ICD-10-CM

## 2019-05-22 DIAGNOSIS — Z13.220 SCREENING FOR CHOLESTEROL LEVEL: ICD-10-CM

## 2019-05-22 DIAGNOSIS — F41.9 ANXIETY: ICD-10-CM

## 2019-05-22 LAB
ALBUMIN SERPL BCP-MCNC: 3.5 G/DL (ref 3.5–5)
ALP SERPL-CCNC: 47 U/L (ref 46–116)
ALT SERPL W P-5'-P-CCNC: 20 U/L (ref 12–78)
ANION GAP SERPL CALCULATED.3IONS-SCNC: 7 MMOL/L (ref 4–13)
AST SERPL W P-5'-P-CCNC: 13 U/L (ref 5–45)
BASOPHILS # BLD AUTO: 0.04 THOUSANDS/ΜL (ref 0–0.1)
BASOPHILS NFR BLD AUTO: 1 % (ref 0–1)
BILIRUB SERPL-MCNC: 0.43 MG/DL (ref 0.2–1)
BUN SERPL-MCNC: 15 MG/DL (ref 5–25)
CALCIUM SERPL-MCNC: 8.3 MG/DL (ref 8.3–10.1)
CHLORIDE SERPL-SCNC: 105 MMOL/L (ref 100–108)
CHOLEST SERPL-MCNC: 182 MG/DL (ref 50–200)
CO2 SERPL-SCNC: 24 MMOL/L (ref 21–32)
CREAT SERPL-MCNC: 0.92 MG/DL (ref 0.6–1.3)
EOSINOPHIL # BLD AUTO: 0.33 THOUSAND/ΜL (ref 0–0.61)
EOSINOPHIL NFR BLD AUTO: 5 % (ref 0–6)
ERYTHROCYTE [DISTWIDTH] IN BLOOD BY AUTOMATED COUNT: 12 % (ref 11.6–15.1)
GFR SERPL CREATININE-BSD FRML MDRD: 73 ML/MIN/1.73SQ M
GLUCOSE P FAST SERPL-MCNC: 85 MG/DL (ref 65–99)
HCT VFR BLD AUTO: 42.8 % (ref 34.8–46.1)
HDLC SERPL-MCNC: 57 MG/DL (ref 40–60)
HGB BLD-MCNC: 14.5 G/DL (ref 11.5–15.4)
IMM GRANULOCYTES # BLD AUTO: 0.01 THOUSAND/UL (ref 0–0.2)
IMM GRANULOCYTES NFR BLD AUTO: 0 % (ref 0–2)
LDLC SERPL CALC-MCNC: 86 MG/DL (ref 0–100)
LYMPHOCYTES # BLD AUTO: 2.22 THOUSANDS/ΜL (ref 0.6–4.47)
LYMPHOCYTES NFR BLD AUTO: 35 % (ref 14–44)
MCH RBC QN AUTO: 31.8 PG (ref 26.8–34.3)
MCHC RBC AUTO-ENTMCNC: 33.9 G/DL (ref 31.4–37.4)
MCV RBC AUTO: 94 FL (ref 82–98)
MONOCYTES # BLD AUTO: 0.41 THOUSAND/ΜL (ref 0.17–1.22)
MONOCYTES NFR BLD AUTO: 6 % (ref 4–12)
NEUTROPHILS # BLD AUTO: 3.42 THOUSANDS/ΜL (ref 1.85–7.62)
NEUTS SEG NFR BLD AUTO: 53 % (ref 43–75)
NRBC BLD AUTO-RTO: 0 /100 WBCS
PLATELET # BLD AUTO: 356 THOUSANDS/UL (ref 149–390)
PMV BLD AUTO: 9.6 FL (ref 8.9–12.7)
POTASSIUM SERPL-SCNC: 4.1 MMOL/L (ref 3.5–5.3)
PROT SERPL-MCNC: 7.3 G/DL (ref 6.4–8.2)
RBC # BLD AUTO: 4.56 MILLION/UL (ref 3.81–5.12)
SODIUM SERPL-SCNC: 136 MMOL/L (ref 136–145)
TRIGL SERPL-MCNC: 195 MG/DL
WBC # BLD AUTO: 6.43 THOUSAND/UL (ref 4.31–10.16)

## 2019-05-22 PROCEDURE — 80061 LIPID PANEL: CPT

## 2019-05-22 PROCEDURE — 80053 COMPREHEN METABOLIC PANEL: CPT

## 2019-05-22 PROCEDURE — 85025 COMPLETE CBC W/AUTO DIFF WBC: CPT

## 2019-05-22 PROCEDURE — 36415 COLL VENOUS BLD VENIPUNCTURE: CPT

## 2019-05-29 ENCOUNTER — TELEPHONE (OUTPATIENT)
Dept: GASTROENTEROLOGY | Facility: CLINIC | Age: 49
End: 2019-05-29

## 2019-05-29 ENCOUNTER — PREP FOR PROCEDURE (OUTPATIENT)
Dept: GASTROENTEROLOGY | Facility: MEDICAL CENTER | Age: 49
End: 2019-05-29

## 2019-05-29 DIAGNOSIS — Z12.11 SCREENING FOR COLON CANCER: Primary | ICD-10-CM

## 2019-06-04 ENCOUNTER — ANESTHESIA EVENT (OUTPATIENT)
Dept: GASTROENTEROLOGY | Facility: MEDICAL CENTER | Age: 49
End: 2019-06-04

## 2019-06-05 ENCOUNTER — ANESTHESIA (OUTPATIENT)
Dept: GASTROENTEROLOGY | Facility: MEDICAL CENTER | Age: 49
End: 2019-06-05

## 2019-06-05 ENCOUNTER — HOSPITAL ENCOUNTER (OUTPATIENT)
Dept: GASTROENTEROLOGY | Facility: MEDICAL CENTER | Age: 49
Setting detail: OUTPATIENT SURGERY
Discharge: HOME | End: 2019-06-05
Attending: INTERNAL MEDICINE | Admitting: INTERNAL MEDICINE
Payer: COMMERCIAL

## 2019-06-05 VITALS
SYSTOLIC BLOOD PRESSURE: 131 MMHG | OXYGEN SATURATION: 99 % | WEIGHT: 185 LBS | RESPIRATION RATE: 16 BRPM | DIASTOLIC BLOOD PRESSURE: 60 MMHG | TEMPERATURE: 98.9 F | HEIGHT: 68 IN | HEART RATE: 59 BPM | BODY MASS INDEX: 28.04 KG/M2

## 2019-06-05 DIAGNOSIS — Z12.11 SCREENING FOR COLON CANCER: ICD-10-CM

## 2019-06-05 LAB — EXT PREGNANCY TEST URINE: NEGATIVE

## 2019-06-05 PROCEDURE — G0121 COLON CA SCRN NOT HI RSK IND: HCPCS | Performed by: INTERNAL MEDICINE

## 2019-06-05 PROCEDURE — 81025 URINE PREGNANCY TEST: CPT | Performed by: ANESTHESIOLOGY

## 2019-06-05 RX ORDER — SODIUM CHLORIDE 9 MG/ML
INJECTION, SOLUTION INTRAVENOUS CONTINUOUS PRN
Status: DISCONTINUED | OUTPATIENT
Start: 2019-06-05 | End: 2019-06-05 | Stop reason: SURG

## 2019-06-05 RX ORDER — PROPOFOL 10 MG/ML
INJECTION, EMULSION INTRAVENOUS AS NEEDED
Status: DISCONTINUED | OUTPATIENT
Start: 2019-06-05 | End: 2019-06-05 | Stop reason: SURG

## 2019-06-05 RX ORDER — SODIUM CHLORIDE 9 MG/ML
125 INJECTION, SOLUTION INTRAVENOUS CONTINUOUS
Status: DISCONTINUED | OUTPATIENT
Start: 2019-06-05 | End: 2019-06-09 | Stop reason: HOSPADM

## 2019-06-05 RX ADMIN — PROPOFOL 50 MG: 10 INJECTION, EMULSION INTRAVENOUS at 09:56

## 2019-06-05 RX ADMIN — SODIUM CHLORIDE: 0.9 INJECTION, SOLUTION INTRAVENOUS at 09:42

## 2019-06-05 RX ADMIN — LIDOCAINE HYDROCHLORIDE 50 MG: 20 INJECTION, SOLUTION INTRAVENOUS at 09:44

## 2019-06-05 RX ADMIN — PROPOFOL 25 MG: 10 INJECTION, EMULSION INTRAVENOUS at 09:48

## 2019-06-05 RX ADMIN — SODIUM CHLORIDE 125 ML/HR: 0.9 INJECTION, SOLUTION INTRAVENOUS at 09:05

## 2019-06-05 RX ADMIN — PROPOFOL 125 MG: 10 INJECTION, EMULSION INTRAVENOUS at 09:44

## 2019-06-05 RX ADMIN — PROPOFOL 50 MG: 10 INJECTION, EMULSION INTRAVENOUS at 09:52

## 2019-06-12 DIAGNOSIS — Z12.31 ENCOUNTER FOR SCREENING MAMMOGRAM FOR MALIGNANT NEOPLASM OF BREAST: ICD-10-CM

## 2019-06-26 ENCOUNTER — ANNUAL EXAM (OUTPATIENT)
Dept: GYNECOLOGY | Facility: CLINIC | Age: 49
End: 2019-06-26
Payer: COMMERCIAL

## 2019-06-26 VITALS
WEIGHT: 187.4 LBS | DIASTOLIC BLOOD PRESSURE: 72 MMHG | HEART RATE: 71 BPM | TEMPERATURE: 97.5 F | HEIGHT: 68 IN | BODY MASS INDEX: 28.4 KG/M2 | SYSTOLIC BLOOD PRESSURE: 124 MMHG | RESPIRATION RATE: 17 BRPM

## 2019-06-26 DIAGNOSIS — Z12.39 BREAST CANCER SCREENING: ICD-10-CM

## 2019-06-26 DIAGNOSIS — N92.0 MENORRHAGIA WITH REGULAR CYCLE: ICD-10-CM

## 2019-06-26 DIAGNOSIS — Z01.419 ENCOUNTER FOR GYNECOLOGICAL EXAMINATION (GENERAL) (ROUTINE) WITHOUT ABNORMAL FINDINGS: Primary | ICD-10-CM

## 2019-06-26 PROCEDURE — 99396 PREV VISIT EST AGE 40-64: CPT | Performed by: OBSTETRICS & GYNECOLOGY

## 2019-06-26 RX ORDER — NORETHINDRONE ACETATE AND ETHINYL ESTRADIOL AND FERROUS FUMARATE 1MG-20(24)
1 KIT ORAL DAILY
Qty: 84 TABLET | Refills: 4 | Status: SHIPPED | OUTPATIENT
Start: 2019-06-26 | End: 2020-12-14

## 2019-09-27 DIAGNOSIS — I10 BENIGN ESSENTIAL HYPERTENSION: ICD-10-CM

## 2019-09-27 RX ORDER — LISINOPRIL 10 MG/1
TABLET ORAL
Qty: 90 TABLET | Refills: 4 | Status: SHIPPED | OUTPATIENT
Start: 2019-09-27 | End: 2019-11-29 | Stop reason: SDUPTHER

## 2019-10-29 ENCOUNTER — DOCTOR'S OFFICE (OUTPATIENT)
Dept: URBAN - METROPOLITAN AREA CLINIC 136 | Facility: CLINIC | Age: 49
Setting detail: OPHTHALMOLOGY
End: 2019-10-29
Payer: COMMERCIAL

## 2019-10-29 ENCOUNTER — RX ONLY (RX ONLY)
Age: 49
End: 2019-10-29

## 2019-10-29 DIAGNOSIS — Z79.891: ICD-10-CM

## 2019-10-29 PROBLEM — E05.00 GRAVES/THYROID OPH NO CRISIS: Status: ACTIVE | Noted: 2019-04-23
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
## 2019-10-29 PROCEDURE — 92014 COMPRE OPH EXAM EST PT 1/>: CPT | Performed by: OPTOMETRIST

## 2019-10-29 PROCEDURE — 92134 CPTRZ OPH DX IMG PST SGM RTA: CPT | Performed by: OPTOMETRIST

## 2019-10-29 ASSESSMENT — REFRACTION_AUTOREFRACTION
OS_AXIS: 068
OS_SPHERE: -1.00
OD_AXIS: 108
OD_SPHERE: -0.50
OD_CYLINDER: -2.00
OS_CYLINDER: -0.75

## 2019-10-29 ASSESSMENT — REFRACTION_MANIFEST
OS_VA2: 20/
OD_VA2: 20/
OS_VA2: 20/
OD_VA1: 20/
OD_VA1: 20/
OS_VA1: 20/
OD_VA3: 20/
OU_VA: 20/
OS_VA3: 20/
OS_VA3: 20/
OD_VA2: 20/
OU_VA: 20/
OD_VA3: 20/
OS_VA1: 20/

## 2019-10-29 ASSESSMENT — SPHEQUIV_DERIVED
OS_SPHEQUIV: -1.375
OD_SPHEQUIV: -1.5

## 2019-10-29 ASSESSMENT — CONFRONTATIONAL VISUAL FIELD TEST (CVF)
OD_FINDINGS: FULL
OS_FINDINGS: FULL

## 2019-10-29 ASSESSMENT — VISUAL ACUITY
OS_BCVA: 20/25-1
OD_BCVA: 20/100

## 2019-10-29 ASSESSMENT — REFRACTION_CURRENTRX
OD_OVR_VA: 20/
OS_OVR_VA: 20/
OS_OVR_VA: 20/
OD_OVR_VA: 20/
OS_OVR_VA: 20/
OD_OVR_VA: 20/

## 2019-10-29 ASSESSMENT — TEAR BREAK UP TIME (TBUT)
OS_TBUT: 4-5 SEC
OD_TBUT: 4-5 SEC

## 2019-11-29 ENCOUNTER — OFFICE VISIT (OUTPATIENT)
Dept: FAMILY MEDICINE CLINIC | Facility: CLINIC | Age: 49
End: 2019-11-29
Payer: COMMERCIAL

## 2019-11-29 VITALS
HEART RATE: 69 BPM | SYSTOLIC BLOOD PRESSURE: 116 MMHG | TEMPERATURE: 98.3 F | OXYGEN SATURATION: 97 % | DIASTOLIC BLOOD PRESSURE: 70 MMHG

## 2019-11-29 DIAGNOSIS — R10.31 RIGHT LOWER QUADRANT ABDOMINAL PAIN: ICD-10-CM

## 2019-11-29 DIAGNOSIS — M54.9 BACK PAIN, UNSPECIFIED BACK LOCATION, UNSPECIFIED BACK PAIN LATERALITY, UNSPECIFIED CHRONICITY: ICD-10-CM

## 2019-11-29 DIAGNOSIS — M19.90 ARTHRITIS: ICD-10-CM

## 2019-11-29 DIAGNOSIS — B00.1 RECURRENT COLD SORES: ICD-10-CM

## 2019-11-29 DIAGNOSIS — M54.12 CERVICAL RADICULOPATHY AT C6: ICD-10-CM

## 2019-11-29 DIAGNOSIS — E05.00 GRAVES' DISEASE: ICD-10-CM

## 2019-11-29 DIAGNOSIS — Z13.220 SCREENING FOR CHOLESTEROL LEVEL: ICD-10-CM

## 2019-11-29 DIAGNOSIS — J45.20 MILD INTERMITTENT ASTHMA WITHOUT COMPLICATION: ICD-10-CM

## 2019-11-29 DIAGNOSIS — F41.9 ANXIETY: Primary | ICD-10-CM

## 2019-11-29 DIAGNOSIS — I10 BENIGN ESSENTIAL HYPERTENSION: ICD-10-CM

## 2019-11-29 DIAGNOSIS — R20.2 PARESTHESIA OF LEFT LOWER EXTREMITY: ICD-10-CM

## 2019-11-29 DIAGNOSIS — G57.91 NEUROPATHY OF RIGHT LOWER EXTREMITY: ICD-10-CM

## 2019-11-29 PROBLEM — R05.9 COUGH: Status: RESOLVED | Noted: 2017-03-08 | Resolved: 2019-11-29

## 2019-11-29 PROCEDURE — 99214 OFFICE O/P EST MOD 30 MIN: CPT | Performed by: FAMILY MEDICINE

## 2019-11-29 PROCEDURE — 3078F DIAST BP <80 MM HG: CPT | Performed by: FAMILY MEDICINE

## 2019-11-29 PROCEDURE — 3074F SYST BP LT 130 MM HG: CPT | Performed by: FAMILY MEDICINE

## 2019-11-29 PROCEDURE — 1036F TOBACCO NON-USER: CPT | Performed by: FAMILY MEDICINE

## 2019-11-29 RX ORDER — ALBUTEROL SULFATE 90 UG/1
1 AEROSOL, METERED RESPIRATORY (INHALATION) EVERY 4 HOURS PRN
Qty: 3 INHALER | Refills: 0 | Status: SHIPPED | OUTPATIENT
Start: 2019-11-29 | End: 2020-12-14

## 2019-11-29 RX ORDER — METHOCARBAMOL 750 MG/1
750 TABLET, FILM COATED ORAL 3 TIMES DAILY
Qty: 360 TABLET | Refills: 1 | Status: SHIPPED | OUTPATIENT
Start: 2019-11-29 | End: 2020-07-19

## 2019-11-29 RX ORDER — PREGABALIN 50 MG/1
50 CAPSULE ORAL 2 TIMES DAILY
Qty: 60 CAPSULE | Refills: 2 | Status: SHIPPED | OUTPATIENT
Start: 2019-11-29 | End: 2020-03-20 | Stop reason: SDUPTHER

## 2019-11-29 RX ORDER — LISINOPRIL 10 MG/1
10 TABLET ORAL DAILY
Qty: 90 TABLET | Refills: 1 | Status: SHIPPED | OUTPATIENT
Start: 2019-11-29 | End: 2020-11-09

## 2019-11-29 NOTE — ASSESSMENT & PLAN NOTE
Nonspecific inflammatory arthritis  This is being followed by Rheumatology, Otto Story  Patient currently on Plaquenil in [facility]

## 2019-11-29 NOTE — ASSESSMENT & PLAN NOTE
Still with ongoing neck pain  MRI cervical spine November 2018 was reviewed with patient today    She is stable on methocarbamol 750 t i d   Continue regular follow-up with her neurologist

## 2019-11-29 NOTE — PROGRESS NOTES
[facility]      NAME: Sujit Bravo  AGE: 47 y o  SEX: female  : 1970   MRN: [id number]    DATE: 2019  TIME: 11:20 AM    Assessment and Plan     Problem List Items Addressed This Visit     Anxiety - Primary       Doing well with rare use of alprazolam 0 5 mg  Patient averages a few tablets a year  No side effects         Benign essential hypertension       Controlled on lisinopril 10 mg daily         Relevant Medications    lisinopril (ZESTRIL) 10 mg tablet    Other Relevant Orders    CBC and differential    Comprehensive metabolic panel    Cervical radiculopathy at C6      Still with ongoing neck pain  MRI cervical spine 2018 was reviewed with patient today  She is stable on methocarbamol 750 t i d   Continue regular follow-up with her neurologist         Graves' disease      Stable on Tapazole 5 mg daily  States this is being followed by Endocrinology         Relevant Medications    diclofenac sodium (VOLTAREN) 50 mg EC tablet    Recurrent cold sores      Doing well with p r n  Use of Valtrex         Arthritis      Nonspecific inflammatory arthritis  This is being followed by Rheumatology, Twan Monteiro  Patient currently on Plaquenil in [place]  Neuropathy of right lower extremity      Ongoing pain/ numbness right lower extremity and foot  She is not getting much relief since starting gabapentin 300 t i d   Will discontinue and change to Lyrica 50 mg b i d   Patient instructed to increase to t i d  After 2 weeks if no improvement  Call in 1 month for update         Relevant Medications    pregabalin (LYRICA) 50 mg capsule    Mild intermittent asthma without complication      Stable on p r n  Use of albuterol HFA         Relevant Medications    albuterol (VENTOLIN HFA) 90 mcg/act inhaler    Right lower quadrant abdominal pain      Patient complains of occasional episodes of right lower quadrant abdominal pain    The symptoms been going on for a little over a month   She states about 1- 2 times per week they occur  Denies any changes in appetite  No nausea or vomiting  No changes in bowels  No urinary complaints  Her examination is essentially benign today  If symptoms persist, consider checking CT scan of abdomen and pelvis  Patient is current with gyn exams         RESOLVED: Paresthesia of left lower extremity      Other Visit Diagnoses     Screening for cholesterol level        Relevant Orders    Lipid Panel with Direct LDL reflex    Back pain, unspecified back location, unspecified back pain laterality, unspecified chronicity        Relevant Medications    diclofenac sodium (VOLTAREN) 50 mg EC tablet    methocarbamol (ROBAXIN) 750 mg tablet              Return to office in: 6 mos, yearly FBW prior    Refuses flu shot  Current w/ colonoscopy  Current w/ gyn/mammo    Chief Complaint     Chief Complaint   Patient presents with    Follow-up     Pt is here for a med check  pt right side lower abdominal pain on and off for  about one month now  History of Present Illness       Patient presents for recheck of chronic medical problems today  She is still having ongoing problems with right leg and foot pain  Despite taking gabapentin 300 t i d  She is also having occasional pain in her right lower quadrant for the past month or so  Pain is intermittent  Denies any nausea or vomiting  Appetite is good  No changes in bowels  Otherwise doing well on all current meds  She still sees endocrinologist for her thyroid and rheumatologist for rheumatologic condition  The following portions of the patient's history were reviewed and updated as appropriate: allergies, current medications, past family history, past medical history, past social history, past surgical history and problem list     Review of Systems   Review of Systems   Respiratory: Negative  Cardiovascular: Negative  Gastrointestinal: Positive for abdominal pain   Negative for anal bleeding, blood in stool, constipation, diarrhea and nausea  Genitourinary: Negative  Musculoskeletal: Positive for arthralgias and myalgias  Active Problem List     Patient Active Problem List   Diagnosis    Near syncope    Slurred speech    Vertigo    Acute costochondritis    Acute hip pain, right    Allergic rhinitis due to pollen    Anxiety    Atypical chest pain    Benign essential hypertension    Bilateral low back pain with right-sided sciatica    Cervical radiculopathy at C6    Contact dermatitis due to adhesives    Depression    Fatigue    Graves' disease    Hyperthyroidism    Hypertriglyceridemia    Neck pain    Osteoarthritis of hip    Other dyspnea and respiratory abnormality    Recurrent cold sores    Shoulder pain    Trochanteric bursitis    Vitamin D deficiency    Arthritis    Neuropathy of right lower extremity    Mild intermittent asthma without complication    Right lower quadrant abdominal pain       Objective   /70 (BP Location: Left arm, Patient Position: Sitting, Cuff Size: Adult)   Pulse 69   Temp 98 3 °F (36 8 °C) (Tympanic)   LMP 11/08/2019 (Within Weeks)   SpO2 97%   Breastfeeding? No     Physical Exam   Cardiovascular: Normal rate, regular rhythm, normal heart sounds and intact distal pulses  Carotids: no bruits  Ext: no edema   Pulmonary/Chest: Effort normal  No respiratory distress  She has no wheezes  She has no rales  Abdominal: Soft  Bowel sounds are normal  She exhibits no distension and no mass  There is no tenderness  There is no rebound and no guarding  No hernia  Psychiatric: She has a normal mood and affect   Her behavior is normal  Thought content normal        Pertinent Laboratory/Diagnostic Studies:  labs 5/2019    Current Medications     Current Outpatient Medications:     albuterol (VENTOLIN HFA) 90 mcg/act inhaler, Inhale 1 puff every 4 (four) hours as needed for wheezing or shortness of breath, Disp: 3 Inhaler, Rfl: 0    ALPRAZolam (XANAX) 0 5 mg tablet, Take 1 tablet (0 5 mg total) by mouth 2 (two) times a day as needed for anxiety, Disp: 30 tablet, Rfl: 0    Calcium 500-100 MG-UNIT CHEW, Calcium 500, Disp: , Rfl:     clobetasol (TEMOVATE) 0 05 % ointment, Apply topically 2 (two) times a day, Disp: , Rfl: 3    diclofenac sodium (VOLTAREN) 50 mg EC tablet, Take 1 tablet (50 mg total) by mouth 2 (two) times a day, Disp: 180 tablet, Rfl: 1    EPINEPHrine (EPIPEN 2-RYAN) 0 3 mg/0 3 mL SOAJ, Inject 0 3 mL (0 3 mg total) into a muscle once for 1 dose, Disp: 0 6 mL, Rfl: 2    hydroxychloroquine (PLAQUENIL) 200 mg tablet, Take 200 mg by mouth 2 (two) times a day with meals , Disp: , Rfl:     lisinopril (ZESTRIL) 10 mg tablet, Take 1 tablet (10 mg total) by mouth daily, Disp: 90 tablet, Rfl: 1    methimazole (TAPAZOLE) 5 mg tablet, Take 1 tablet by mouth daily  , Disp: , Rfl:     methocarbamol (ROBAXIN) 750 mg tablet, Take 1 tablet (750 mg total) by mouth 3 (three) times a day, Disp: 360 tablet, Rfl: 1    Multiple Vitamins-Minerals (MULTIVITAMIN ADULT EXTRA C PO), one daily, Disp: , Rfl:     norethindrone-ethinyl estradiol-ferrous fumarate (LOESTIN 24 FE) 1-20 MG-MCG(24) per tablet, Take 1 tablet by mouth daily, Disp: 84 tablet, Rfl: 4    valACYclovir (VALTREX) 1,000 mg tablet, Take 1 tablet (1,000 mg total) by mouth 2 (two) times a day for 24 days, Disp: 12 tablet, Rfl: 3    fexofenadine (ALLEGRA ALLERGY) 180 MG tablet, Take 180 mg by mouth Daily , Disp: , Rfl:     Flaxseed, Linseed, (FLAXSEED OIL) 1000 MG CAPS, Take 1 capsule by mouth daily , Disp: , Rfl:     meclizine (ANTIVERT) 25 mg tablet, 25 mg every 8 (eight) hours as needed  , Disp: , Rfl:     predniSONE 20 mg tablet, Take 20 mg by mouth as needed, Disp: , Rfl:     pregabalin (LYRICA) 50 mg capsule, Take 1 capsule (50 mg total) by mouth 2 (two) times a day, Disp: 60 capsule, Rfl: 2    Health Maintenance     Health Maintenance   Topic Date Due    DTaP,Tdap,and Td Vaccines (1 - Tdap) 03/01/1980    HIV Screening  03/01/1984    BMI: Followup Plan  03/01/1987    Influenza Vaccine  02/07/2020 (Originally 7/1/2019)    MAMMOGRAM  06/07/2020    BMI: Adult  06/26/2020    Cervical Cancer Screening  12/08/2022    CRC Screening: Colonoscopy  06/05/2029    Pneumococcal Vaccine: 65+ Years (1 of 2 - PCV13) 03/01/2034    Pneumococcal Vaccine: Pediatrics (0 to 5 Years) and At-Risk Patients (6 to 59 Years)  Aged Out    HIB Vaccine  Aged Out    Hepatitis B Vaccine  Aged Out    IPV Vaccine  Aged Out    Hepatitis A Vaccine  Aged Out    Meningococcal ACWY Vaccine  Aged Out    HPV Vaccine  Aged Out     There is no immunization history for the selected administration types on file for this patient      William Vizcarra DO  [facility]

## 2019-11-29 NOTE — ASSESSMENT & PLAN NOTE
Doing well with rare use of alprazolam 0 5 mg  Patient averages a few tablets a year    No side effects

## 2019-11-29 NOTE — ASSESSMENT & PLAN NOTE
Ongoing pain/ numbness right lower extremity and foot  She is not getting much relief since starting gabapentin 300 t i d   Will discontinue and change to Lyrica 50 mg b i d   Patient instructed to increase to t i d  After 2 weeks if no improvement    Call in 1 month for update

## 2019-11-29 NOTE — ASSESSMENT & PLAN NOTE
Patient complains of occasional episodes of right lower quadrant abdominal pain  The symptoms been going on for a little over a month  She states about 1- 2 times per week they occur  Denies any changes in appetite  No nausea or vomiting  No changes in bowels  No urinary complaints  Her examination is essentially benign today  If symptoms persist, consider checking CT scan of abdomen and pelvis    Patient is current with gyn exams

## 2019-12-04 ENCOUNTER — TELEPHONE (OUTPATIENT)
Dept: FAMILY MEDICINE CLINIC | Facility: CLINIC | Age: 49
End: 2019-12-04

## 2019-12-04 NOTE — TELEPHONE ENCOUNTER
Call patient  I reviewed her old medical records and found that she had a nerve conduction study done of her neck and upper extremities in 2017 ( ordered by Dr Joseph Porter at [facility])  I did not see any studies of her lower extremities     Recommend continue newly prescribed medication, Lyrica  If symptoms are not improving, consider getting nerve conduction study of her lower extremities

## 2020-03-20 DIAGNOSIS — G57.91 NEUROPATHY OF RIGHT LOWER EXTREMITY: ICD-10-CM

## 2020-03-20 NOTE — TELEPHONE ENCOUNTER
VM from patient requesting 90 day supply for Lyrica 50 mg BID  Are you ok with sending this to mail order?     Last OV 11/29/19  Next OV 5/29/20  Per PDMP last filled 2/21/20 #60

## 2020-03-21 RX ORDER — PREGABALIN 50 MG/1
50 CAPSULE ORAL 2 TIMES DAILY
Qty: 180 CAPSULE | Refills: 0 | Status: SHIPPED | OUTPATIENT
Start: 2020-03-21 | End: 2020-07-21 | Stop reason: SDUPTHER

## 2020-06-08 PROBLEM — E24.9 CUSHING'S SYNDROME (HCC): Status: ACTIVE | Noted: 2020-06-08

## 2020-07-19 DIAGNOSIS — B00.1 HERPES LABIALIS: ICD-10-CM

## 2020-07-19 DIAGNOSIS — M54.9 BACK PAIN, UNSPECIFIED BACK LOCATION, UNSPECIFIED BACK PAIN LATERALITY, UNSPECIFIED CHRONICITY: ICD-10-CM

## 2020-07-19 RX ORDER — METHOCARBAMOL 750 MG/1
TABLET, FILM COATED ORAL
Qty: 360 TABLET | Refills: 0 | Status: SHIPPED | OUTPATIENT
Start: 2020-07-19 | End: 2020-10-09

## 2020-07-19 RX ORDER — VALACYCLOVIR HYDROCHLORIDE 1 G/1
TABLET, FILM COATED ORAL
Qty: 48 TABLET | Refills: 1 | Status: SHIPPED | OUTPATIENT
Start: 2020-07-19 | End: 2021-06-07

## 2020-07-21 ENCOUNTER — TELEPHONE (OUTPATIENT)
Dept: GYNECOLOGY | Facility: CLINIC | Age: 50
End: 2020-07-21

## 2020-07-21 DIAGNOSIS — G57.91 NEUROPATHY OF RIGHT LOWER EXTREMITY: ICD-10-CM

## 2020-07-21 RX ORDER — PREGABALIN 50 MG/1
50 CAPSULE ORAL 2 TIMES DAILY
Qty: 180 CAPSULE | Refills: 1 | Status: SHIPPED | OUTPATIENT
Start: 2020-07-21 | End: 2020-12-14

## 2020-07-21 NOTE — TELEPHONE ENCOUNTER
----- Message from [id number]  [facility] [place] sent at 7/20/2020  2:43 PM EDT -----  Regarding: needs appnancy Vazquez patient  - received refill request from pharm, needs appt

## 2020-07-28 NOTE — PROGRESS NOTES
Assessment/Plan:      Patient advised to stop OCP after current pack, monitor menses and check FSH, estradiol in November  She is not sexually active but is aware to use condoms to prevent pregnancy if she becomes sexually active during this time  Recommended monthly SBE, annual CBE and annual screening mammo  ASCCP guidelines reviewed and pap with cotesting noted done today  Colonoscopy noted to be up to date  Reviewed diet/activity recommendations Calcium 1970-8670 mg and Vit D 600-1000 IU daily  Discussed postmenopausal considerations and symptoms to report  Kegel exercises as instructed  RTO in one year for routine annual gyn exam or sooner PRN  Diagnoses and all orders for this visit:    Encounter for gynecological examination (general) (routine) without abnormal findings    Screening mammogram, encounter for  -     Mammo screening bilateral w 3d & cad; Future    Menopause  -     Estradiol; Future  -     Follicle stimulating hormone; Future        Subjective:      Patient ID: Jesi Rose is a 45 y o  female  This patient presents for routine annual gyn exam  She is a former Dr Meghan Merino patient last seen 6/26/19  She's on Loestrin 24 for hx of menorrhagia  She denies acute gyn complaints  She denies AUB bleeding or spotting, pelvic pain, breast concerns, abnormal discharge, bowel/bladder dysfunction  Patient is not in a relationship and is not sexually active  Pap/HPV up to date and normal, 12/8/17  Mammography up to date and normal, 6/11/19  Colonoscopy up to date, 5/29/19  The following portions of the patient's history were reviewed and updated as appropriate: allergies, current medications, past family history, past medical history, past social history, past surgical history and problem list     Review of Systems   Constitutional: Negative  HENT: Negative  Respiratory: Negative  Cardiovascular: Negative  Gastrointestinal: Negative  Endocrine: Negative  Genitourinary: Negative for difficulty urinating, dyspareunia, dysuria, frequency, menstrual problem, pelvic pain, urgency, vaginal bleeding, vaginal discharge and vaginal pain  Musculoskeletal: Positive for arthralgias and myalgias  Skin: Negative  Neurological: Negative  Psychiatric/Behavioral: Negative  Objective:      /84   Pulse 85   LMP 07/21/2020          Physical Exam   Constitutional: She is oriented to person, place, and time  She appears well-developed and well-nourished  She is cooperative  HENT:   Head: Normocephalic and atraumatic  Neck: Normal range of motion  Neck supple  No thyroid mass and no thyromegaly present  Cardiovascular: Normal rate, regular rhythm and normal heart sounds  Pulmonary/Chest: Effort normal and breath sounds normal  Right breast exhibits no inverted nipple, no mass, no nipple discharge, no skin change and no tenderness  Left breast exhibits no inverted nipple, no mass, no nipple discharge, no skin change and no tenderness  No breast tenderness or discharge  Breasts are symmetrical    Abdominal: Soft  Normal appearance and bowel sounds are normal  There is no hepatosplenomegaly  There is no tenderness  Hernia confirmed negative in the right inguinal area and confirmed negative in the left inguinal area  Genitourinary: Vagina normal and uterus normal  Rectal exam shows no external hemorrhoid  No breast tenderness or discharge  Pelvic exam was performed with patient supine  No labial fusion  There is no rash, tenderness, lesion or injury on the right labia  There is no rash, tenderness, lesion or injury on the left labia  Uterus is not deviated, not enlarged, not fixed and not tender  Cervix exhibits no motion tenderness, no discharge and no friability  Right adnexum displays no mass, no tenderness and no fullness  Left adnexum displays no mass, no tenderness and no fullness  No erythema, tenderness or bleeding in the vagina   No signs of injury around the vagina  No vaginal discharge found  Genitourinary Comments: Urethra normal without lesions  No bladder tenderness   Musculoskeletal: Normal range of motion  Lymphadenopathy:     She has no axillary adenopathy  No inguinal adenopathy noted on the right or left side  Right: No inguinal adenopathy present  Left: No inguinal adenopathy present  Neurological: She is alert and oriented to person, place, and time  Skin: Skin is warm, dry and intact  Psychiatric: She has a normal mood and affect  Her speech is normal and behavior is normal  Cognition and memory are normal    Nursing note and vitals reviewed

## 2020-07-29 ENCOUNTER — ANNUAL EXAM (OUTPATIENT)
Dept: GYNECOLOGY | Facility: CLINIC | Age: 50
End: 2020-07-29
Payer: COMMERCIAL

## 2020-07-29 VITALS — HEART RATE: 85 BPM | DIASTOLIC BLOOD PRESSURE: 84 MMHG | SYSTOLIC BLOOD PRESSURE: 120 MMHG

## 2020-07-29 DIAGNOSIS — Z12.4 ENCOUNTER FOR PAPANICOLAOU SMEAR FOR CERVICAL CANCER SCREENING: ICD-10-CM

## 2020-07-29 DIAGNOSIS — Z78.0 MENOPAUSE: ICD-10-CM

## 2020-07-29 DIAGNOSIS — Z01.419 ENCOUNTER FOR GYNECOLOGICAL EXAMINATION (GENERAL) (ROUTINE) WITHOUT ABNORMAL FINDINGS: Primary | ICD-10-CM

## 2020-07-29 DIAGNOSIS — Z12.31 SCREENING MAMMOGRAM, ENCOUNTER FOR: ICD-10-CM

## 2020-07-29 PROCEDURE — 3074F SYST BP LT 130 MM HG: CPT | Performed by: OBSTETRICS & GYNECOLOGY

## 2020-07-29 PROCEDURE — G0145 SCR C/V CYTO,THINLAYER,RESCR: HCPCS | Performed by: OBSTETRICS & GYNECOLOGY

## 2020-07-29 PROCEDURE — 99396 PREV VISIT EST AGE 40-64: CPT | Performed by: OBSTETRICS & GYNECOLOGY

## 2020-07-29 PROCEDURE — 3079F DIAST BP 80-89 MM HG: CPT | Performed by: OBSTETRICS & GYNECOLOGY

## 2020-07-29 PROCEDURE — 87624 HPV HI-RISK TYP POOLED RSLT: CPT | Performed by: OBSTETRICS & GYNECOLOGY

## 2020-07-30 LAB
HPV HR 12 DNA CVX QL NAA+PROBE: NEGATIVE
HPV16 DNA CVX QL NAA+PROBE: NEGATIVE
HPV18 DNA CVX QL NAA+PROBE: NEGATIVE

## 2020-08-04 LAB
LAB AP GYN PRIMARY INTERPRETATION: NORMAL
Lab: NORMAL

## 2020-10-08 DIAGNOSIS — M54.9 BACK PAIN, UNSPECIFIED BACK LOCATION, UNSPECIFIED BACK PAIN LATERALITY, UNSPECIFIED CHRONICITY: ICD-10-CM

## 2020-10-09 RX ORDER — METHOCARBAMOL 750 MG/1
TABLET, FILM COATED ORAL
Qty: 360 TABLET | Refills: 3 | Status: SHIPPED | OUTPATIENT
Start: 2020-10-09 | End: 2020-12-14

## 2020-11-09 DIAGNOSIS — I10 BENIGN ESSENTIAL HYPERTENSION: ICD-10-CM

## 2020-11-09 RX ORDER — LISINOPRIL 10 MG/1
TABLET ORAL
Qty: 90 TABLET | Refills: 3 | Status: SHIPPED | OUTPATIENT
Start: 2020-11-09 | End: 2021-11-04

## 2020-12-14 ENCOUNTER — OFFICE VISIT (OUTPATIENT)
Dept: FAMILY MEDICINE CLINIC | Facility: CLINIC | Age: 50
End: 2020-12-14
Payer: COMMERCIAL

## 2020-12-14 VITALS
DIASTOLIC BLOOD PRESSURE: 70 MMHG | HEIGHT: 68 IN | HEART RATE: 80 BPM | WEIGHT: 218 LBS | SYSTOLIC BLOOD PRESSURE: 110 MMHG | TEMPERATURE: 98.7 F | RESPIRATION RATE: 16 BRPM | OXYGEN SATURATION: 98 % | BODY MASS INDEX: 33.04 KG/M2

## 2020-12-14 DIAGNOSIS — I10 BENIGN ESSENTIAL HYPERTENSION: ICD-10-CM

## 2020-12-14 DIAGNOSIS — F41.9 ANXIETY: ICD-10-CM

## 2020-12-14 DIAGNOSIS — M54.12 CERVICAL RADICULOPATHY AT C6: ICD-10-CM

## 2020-12-14 DIAGNOSIS — E05.00 GRAVES' DISEASE: ICD-10-CM

## 2020-12-14 DIAGNOSIS — Z00.00 WELL ADULT EXAM: Primary | ICD-10-CM

## 2020-12-14 DIAGNOSIS — J45.20 MILD INTERMITTENT ASTHMA WITHOUT COMPLICATION: ICD-10-CM

## 2020-12-14 DIAGNOSIS — G57.91 NEUROPATHY OF RIGHT LOWER EXTREMITY: ICD-10-CM

## 2020-12-14 DIAGNOSIS — M19.90 ARTHRITIS: ICD-10-CM

## 2020-12-14 DIAGNOSIS — G47.00 INSOMNIA, UNSPECIFIED TYPE: ICD-10-CM

## 2020-12-14 DIAGNOSIS — Z13.220 SCREENING CHOLESTEROL LEVEL: ICD-10-CM

## 2020-12-14 DIAGNOSIS — R74.01 TRANSAMINITIS: ICD-10-CM

## 2020-12-14 PROCEDURE — 99396 PREV VISIT EST AGE 40-64: CPT | Performed by: FAMILY MEDICINE

## 2020-12-14 PROCEDURE — 1036F TOBACCO NON-USER: CPT | Performed by: FAMILY MEDICINE

## 2020-12-14 PROCEDURE — 3074F SYST BP LT 130 MM HG: CPT | Performed by: FAMILY MEDICINE

## 2020-12-14 PROCEDURE — 3078F DIAST BP <80 MM HG: CPT | Performed by: FAMILY MEDICINE

## 2020-12-14 PROCEDURE — 3725F SCREEN DEPRESSION PERFORMED: CPT | Performed by: FAMILY MEDICINE

## 2020-12-14 PROCEDURE — 3008F BODY MASS INDEX DOCD: CPT | Performed by: FAMILY MEDICINE

## 2020-12-14 RX ORDER — PREGABALIN 75 MG/1
75 CAPSULE ORAL 2 TIMES DAILY
Qty: 180 CAPSULE | Refills: 1 | Status: SHIPPED | OUTPATIENT
Start: 2020-12-14 | End: 2021-12-06 | Stop reason: SDUPTHER

## 2020-12-14 RX ORDER — NORTRIPTYLINE HYDROCHLORIDE 25 MG/1
25 CAPSULE ORAL
Qty: 30 CAPSULE | Refills: 2 | Status: SHIPPED | OUTPATIENT
Start: 2020-12-14 | End: 2022-08-03

## 2020-12-14 RX ORDER — TIZANIDINE HYDROCHLORIDE 4 MG/1
4 CAPSULE, GELATIN COATED ORAL 3 TIMES DAILY PRN
Qty: 90 CAPSULE | Refills: 0 | Status: SHIPPED | OUTPATIENT
Start: 2020-12-14 | End: 2022-08-03

## 2021-01-07 LAB — HCV AB SER-ACNC: NEGATIVE

## 2021-06-06 DIAGNOSIS — B00.1 HERPES LABIALIS: ICD-10-CM

## 2021-06-07 RX ORDER — VALACYCLOVIR HYDROCHLORIDE 1 G/1
TABLET, FILM COATED ORAL
Qty: 48 TABLET | Refills: 14 | Status: SHIPPED | OUTPATIENT
Start: 2021-06-07 | End: 2022-08-03

## 2021-08-26 ENCOUNTER — OFFICE VISIT (OUTPATIENT)
Dept: FAMILY MEDICINE CLINIC | Facility: CLINIC | Age: 51
End: 2021-08-26
Payer: COMMERCIAL

## 2021-08-26 VITALS
BODY MASS INDEX: 33.42 KG/M2 | DIASTOLIC BLOOD PRESSURE: 80 MMHG | OXYGEN SATURATION: 98 % | SYSTOLIC BLOOD PRESSURE: 120 MMHG | HEART RATE: 81 BPM | TEMPERATURE: 97.7 F | RESPIRATION RATE: 16 BRPM | HEIGHT: 68 IN

## 2021-08-26 DIAGNOSIS — R74.01 TRANSAMINITIS: ICD-10-CM

## 2021-08-26 DIAGNOSIS — G47.00 INSOMNIA, UNSPECIFIED TYPE: ICD-10-CM

## 2021-08-26 DIAGNOSIS — M54.12 CERVICAL RADICULOPATHY AT C6: ICD-10-CM

## 2021-08-26 DIAGNOSIS — F41.9 ANXIETY: ICD-10-CM

## 2021-08-26 DIAGNOSIS — R73.9 ELEVATED BLOOD SUGAR: ICD-10-CM

## 2021-08-26 DIAGNOSIS — M19.90 ARTHRITIS: ICD-10-CM

## 2021-08-26 DIAGNOSIS — E05.00 GRAVES' DISEASE: ICD-10-CM

## 2021-08-26 DIAGNOSIS — I10 BENIGN ESSENTIAL HYPERTENSION: Primary | ICD-10-CM

## 2021-08-26 DIAGNOSIS — G57.91 NEUROPATHY OF RIGHT LOWER EXTREMITY: ICD-10-CM

## 2021-08-26 DIAGNOSIS — T78.40XD ALLERGIC REACTION, SUBSEQUENT ENCOUNTER: ICD-10-CM

## 2021-08-26 PROBLEM — M94.0 ACUTE COSTOCHONDRITIS: Status: RESOLVED | Noted: 2017-04-13 | Resolved: 2021-08-26

## 2021-08-26 PROBLEM — Z00.00 WELL ADULT EXAM: Status: RESOLVED | Noted: 2020-12-14 | Resolved: 2021-08-26

## 2021-08-26 PROBLEM — M70.60 TROCHANTERIC BURSITIS: Status: RESOLVED | Noted: 2017-11-28 | Resolved: 2021-08-26

## 2021-08-26 PROBLEM — R07.89 ATYPICAL CHEST PAIN: Status: RESOLVED | Noted: 2017-04-13 | Resolved: 2021-08-26

## 2021-08-26 PROBLEM — R10.31 RIGHT LOWER QUADRANT ABDOMINAL PAIN: Status: RESOLVED | Noted: 2019-11-29 | Resolved: 2021-08-26

## 2021-08-26 PROBLEM — R53.83 FATIGUE: Status: RESOLVED | Noted: 2018-01-18 | Resolved: 2021-08-26

## 2021-08-26 PROBLEM — R42 VERTIGO: Status: RESOLVED | Noted: 2018-05-01 | Resolved: 2021-08-26

## 2021-08-26 PROCEDURE — 1036F TOBACCO NON-USER: CPT | Performed by: FAMILY MEDICINE

## 2021-08-26 PROCEDURE — 99214 OFFICE O/P EST MOD 30 MIN: CPT | Performed by: FAMILY MEDICINE

## 2021-08-26 RX ORDER — EPINEPHRINE 0.3 MG/.3ML
0.3 INJECTION SUBCUTANEOUS ONCE
Qty: 0.6 ML | Refills: 2 | Status: SHIPPED | OUTPATIENT
Start: 2021-08-26 | End: 2022-08-03

## 2021-08-26 NOTE — PROGRESS NOTES
[facility]      NAME: Shante March  AGE: 45 y o  SEX: female  : 1970   MRN: [id number]    DATE: 2021  TIME: 3:40 PM    Assessment and Plan     Problem List Items Addressed This Visit     Anxiety    Benign essential hypertension - Primary       Blood pressure controlled on lisinopril 10 mg daily         Relevant Medications    EPINEPHrine (EpiPen 2-Ralph) 0 3 mg/0 3 mL SOAJ    Cervical radiculopathy at C6      History of chronic neck pain  Patient did not do well on recent trial of Zanaflex  Last MRI cervical spine was 2018  Consider repeating scan and referral to pain management if symptoms worsen         Graves' disease      TSH from  was normal   Patient is no longer on Tapazole  Continue follow-up with endocrinology as directed         Arthritis      Stable on  Plaquenil  Continue regular follow-up with Rheumatology         Neuropathy of right lower extremity      Overall improved since increasing Lyrica to 75 mg b i d  Insomnia      At last visit, patient was given trial of nortriptyline  Patient did not like this medication  She is now using OTC natural supplement and doing well         Transaminitis      LFTs on recent labs have normalized  Will continue to follow         Elevated blood sugar      Recent blood sugar 106  Recommend reduced carb diet exercise  Will check A1c in near future         Relevant Orders    Hemoglobin A1C      Other Visit Diagnoses     Allergic reaction, subsequent encounter        Relevant Medications    EPINEPHrine (EpiPen 2-Ralph) 0 3 mg/0 3 mL SOAJ        Pt has not had COVID vaccination yet  Return to office in: 6 mos    Chief Complaint     Chief Complaint   Patient presents with    Follow-up     6 months       History of Present Illness      Patient presents for recheck of chronic medical problems today  Overall she is feeling well  Doing well on lisinopril for blood pressure    No longer on Tapazole for Graves disease  Still taking Plaquenil for  Arthritis  Doing well on Lyrica for neuropathy  The following portions of the patient's history were reviewed and updated as appropriate: allergies, current medications, past family history, past medical history, past social history, past surgical history and problem list     Review of Systems   Review of Systems   Respiratory: Negative  Cardiovascular: Negative  Gastrointestinal: Negative  Genitourinary: Negative  Musculoskeletal: Positive for neck pain  Active Problem List     Patient Active Problem List   Diagnosis    Slurred speech    Allergic rhinitis due to pollen    Anxiety    Benign essential hypertension    Bilateral low back pain with right-sided sciatica    Cervical radiculopathy at C6    Depression    Graves' disease    Hyperthyroidism    Hypertriglyceridemia    Neck pain    Osteoarthritis of hip    Recurrent cold sores    Vitamin D deficiency    Arthritis    Neuropathy of right lower extremity    Mild intermittent asthma without complication    Cushing's syndrome (HCC)    Insomnia    Transaminitis    Elevated blood sugar       Objective   /80 (BP Location: Left arm, Patient Position: Sitting, Cuff Size: Adult)   Pulse 81   Temp 97 7 °F (36 5 °C) (Temporal)   Resp 16   Ht 5' 7 72" (1 72 m)   SpO2 98%   BMI 33 42 kg/m²     Physical Exam  Cardiovascular:      Rate and Rhythm: Normal rate and regular rhythm  Heart sounds: Normal heart sounds  Comments: Carotids: no bruits  Ext: no edema  Pulmonary:      Effort: Pulmonary effort is normal  No respiratory distress  Breath sounds: No wheezing or rales  Psychiatric:         Behavior: Behavior normal          Thought Content:  Thought content normal          Pertinent Laboratory/Diagnostic Studies:  labs reviewed    Current Medications     Current Outpatient Medications:     Calcium 500-100 MG-UNIT CHEW, Calcium 500, Disp: , Rfl:     clobetasol (TEMOVATE) 0 05 % ointment, Apply topically 2 (two) times a day, Disp: , Rfl: 3    fexofenadine (ALLEGRA ALLERGY) 180 MG tablet, Take 180 mg by mouth Daily , Disp: , Rfl:     Flaxseed, Linseed, (FLAXSEED OIL) 1000 MG CAPS, Take 1 capsule by mouth daily , Disp: , Rfl:     hydroxychloroquine (PLAQUENIL) 200 mg tablet, Take 200 mg by mouth 2 (two) times a day with meals , Disp: , Rfl:     lisinopril (ZESTRIL) 10 mg tablet, TAKE 1 TABLET DAILY, Disp: 90 tablet, Rfl: 3    meclizine (ANTIVERT) 25 mg tablet, 25 mg every 8 (eight) hours as needed  , Disp: , Rfl:     Multiple Vitamins-Minerals (MULTIVITAMIN ADULT EXTRA C PO), one daily, Disp: , Rfl:     nortriptyline (PAMELOR) 25 mg capsule, Take 1 capsule (25 mg total) by mouth daily at bedtime, Disp: 30 capsule, Rfl: 2    pregabalin (LYRICA) 75 mg capsule, Take 1 capsule (75 mg total) by mouth 2 (two) times a day, Disp: 180 capsule, Rfl: 1    TiZANidine (Zanaflex) 4 MG capsule, Take 1 capsule (4 mg total) by mouth 3 (three) times a day as needed for muscle spasms, Disp: 90 capsule, Rfl: 0    diclofenac sodium (VOLTAREN) 50 mg EC tablet, Take 1 tablet (50 mg total) by mouth 2 (two) times a day, Disp: 180 tablet, Rfl: 1    EPINEPHrine (EpiPen 2-Ralph) 0 3 mg/0 3 mL SOAJ, Inject 0 3 mL (0 3 mg total) into a muscle once for 1 dose, Disp: 0 6 mL, Rfl: 2    methimazole (TAPAZOLE) 5 mg tablet, Take 1 tablet by mouth daily   (Patient not taking: Reported on 8/26/2021), Disp: , Rfl:     predniSONE 20 mg tablet, Take 20 mg by mouth as needed (Patient not taking: Reported on 8/26/2021), Disp: , Rfl:     valACYclovir (VALTREX) 1,000 mg tablet, TAKE 1 TABLET TWICE A DAY FOR 24 DAYS , Disp: 48 tablet, Rfl: 14    Health Maintenance     Health Maintenance   Topic Date Due    Hepatitis C Screening  Never done    Pneumococcal Vaccine: Pediatrics (0 to 5 Years) and At-Risk Patients (6 to 59 Years) (1 of 2 - PPSV23) Never done    COVID-19 Vaccine (1) Never done    HIV Screening  Never done    BMI: Followup Plan  Never done    DTaP,Tdap,and Td Vaccines (1 - Tdap) Never done    Breast Cancer Screening: Mammogram  06/07/2020    Influenza Vaccine (1) 09/01/2021    BMI: Adult  12/14/2021    Annual Physical  12/14/2021    Depression Remission PHQ  12/14/2021    Cervical Cancer Screening  07/29/2025    Colorectal Cancer Screening  06/05/2029    HIB Vaccine  Aged Out    Hepatitis B Vaccine  Aged Out    IPV Vaccine  Aged Out    Hepatitis A Vaccine  Aged Out    Meningococcal ACWY Vaccine  Aged Out    HPV Vaccine  Aged Out     There is no immunization history for the selected administration types on file for this patient      DO Aly Acuna [facility]

## 2021-08-26 NOTE — ASSESSMENT & PLAN NOTE
At last visit, patient was given trial of nortriptyline  Patient did not like this medication    She is now using OTC natural supplement and doing well
Blood pressure controlled on lisinopril 10 mg daily
History of chronic neck pain  Patient did not do well on recent trial of Zanaflex  Last MRI cervical spine was November 2018    Consider repeating scan and referral to pain management if symptoms worsen
LFTs on recent labs have normalized    Will continue to follow
Overall improved since increasing Lyrica to 75 mg b i d 
Recent blood sugar 106  Recommend reduced carb diet exercise    Will check A1c in near future
Stable on  Plaquenil    Continue regular follow-up with Rheumatology
TSH from August 21st was normal   Patient is no longer on Tapazole    Continue follow-up with endocrinology as directed
36.8

## 2021-11-04 DIAGNOSIS — I10 BENIGN ESSENTIAL HYPERTENSION: ICD-10-CM

## 2021-11-04 RX ORDER — LISINOPRIL 10 MG/1
TABLET ORAL
Qty: 90 TABLET | Refills: 3 | Status: SHIPPED | OUTPATIENT
Start: 2021-11-04

## 2021-12-06 DIAGNOSIS — G57.91 NEUROPATHY OF RIGHT LOWER EXTREMITY: ICD-10-CM

## 2021-12-06 RX ORDER — PREGABALIN 75 MG/1
75 CAPSULE ORAL 2 TIMES DAILY
Qty: 180 CAPSULE | Refills: 1 | Status: SHIPPED | OUTPATIENT
Start: 2021-12-06

## 2022-02-28 ENCOUNTER — OFFICE VISIT (OUTPATIENT)
Dept: FAMILY MEDICINE CLINIC | Facility: CLINIC | Age: 52
End: 2022-02-28
Payer: COMMERCIAL

## 2022-02-28 VITALS
OXYGEN SATURATION: 97 % | BODY MASS INDEX: 33.42 KG/M2 | RESPIRATION RATE: 16 BRPM | SYSTOLIC BLOOD PRESSURE: 118 MMHG | TEMPERATURE: 97.3 F | HEIGHT: 68 IN | DIASTOLIC BLOOD PRESSURE: 78 MMHG | HEART RATE: 83 BPM

## 2022-02-28 DIAGNOSIS — Z12.31 ENCOUNTER FOR SCREENING MAMMOGRAM FOR MALIGNANT NEOPLASM OF BREAST: ICD-10-CM

## 2022-02-28 DIAGNOSIS — E78.1 HYPERTRIGLYCERIDEMIA: ICD-10-CM

## 2022-02-28 DIAGNOSIS — J45.20 MILD INTERMITTENT ASTHMA WITHOUT COMPLICATION: ICD-10-CM

## 2022-02-28 DIAGNOSIS — G57.91 NEUROPATHY OF RIGHT LOWER EXTREMITY: ICD-10-CM

## 2022-02-28 DIAGNOSIS — Z00.00 WELL ADULT EXAM: Primary | ICD-10-CM

## 2022-02-28 DIAGNOSIS — E05.00 GRAVES' DISEASE: ICD-10-CM

## 2022-02-28 DIAGNOSIS — R73.9 ELEVATED BLOOD SUGAR: ICD-10-CM

## 2022-02-28 DIAGNOSIS — G47.00 INSOMNIA, UNSPECIFIED TYPE: ICD-10-CM

## 2022-02-28 DIAGNOSIS — R74.01 TRANSAMINITIS: ICD-10-CM

## 2022-02-28 DIAGNOSIS — M35.9 UNDIFFERENTIATED CONNECTIVE TISSUE DISEASE (HCC): ICD-10-CM

## 2022-02-28 DIAGNOSIS — I10 BENIGN ESSENTIAL HYPERTENSION: ICD-10-CM

## 2022-02-28 PROBLEM — M19.90 ARTHRITIS: Status: RESOLVED | Noted: 2019-05-13 | Resolved: 2022-02-28

## 2022-02-28 PROCEDURE — 99396 PREV VISIT EST AGE 40-64: CPT | Performed by: FAMILY MEDICINE

## 2022-02-28 NOTE — ASSESSMENT & PLAN NOTE
History of mildly elevated blood sugar  Continue reduced carb diet exercise    Will continue monitor

## 2022-02-28 NOTE — PROGRESS NOTES
[facility]      NAME: Ana Rosa Valderrama  AGE: 45 y o  SEX: female  : 1970   MRN: [id number]    DATE: 2022  TIME: 5:32 PM    Assessment and Plan     Problem List Items Addressed This Visit     Benign essential hypertension     Blood pressure controlled on lisinopril 10 mg daily         Relevant Orders    CBC and differential    Graves' disease     TSH has normalized  No longer on methimazole  Continue follow-up with endocrinologist as directed         Relevant Orders    TSH, 3rd generation with Free T4 reflex    Hypertriglyceridemia     Continue low-fat diet exercise  Will recheck lipids prior to next appointment         Relevant Orders    Lipid Panel with Direct LDL reflex    Neuropathy of right lower extremity     Stable on Lyrica 75 mg b i d          Mild intermittent asthma without complication    Insomnia    Transaminitis     History of mild transaminitis  Most recent labs from  were normal   Will continue to follow         Relevant Orders    Comprehensive metabolic panel    Elevated blood sugar     History of mildly elevated blood sugar  Continue reduced carb diet exercise  Will continue monitor         Relevant Orders    Comprehensive metabolic panel    Hemoglobin A1C    Undifferentiated connective tissue disease ([address] )     Symptoms have been stable on hydroxychloroquine  Most recent CRP and sed rates are normal   Continue follow-up with rheumatologist           Other Visit Diagnoses     Well adult exam    -  Primary    Encounter for screening mammogram for malignant neoplasm of breast        Relevant Orders    Mammo screening bilateral w 3d & cad        Rx given for mammogram  Colonoscopy done in 2019      Return to office in: 6 mos, FBW prior at [facility]    Chief Complaint     Chief Complaint   Patient presents with    Physical Exam       History of Present Illness     Patient presents recheck chronic medical problems today    Still being followed by Endocrine and rheumatologist   Patient had labs done February 26th      The following portions of the patient's history were reviewed and updated as appropriate: allergies, current medications, past family history, past medical history, past social history, past surgical history and problem list     Review of Systems   Review of Systems   Respiratory: Negative  Cardiovascular: Negative  Gastrointestinal: Negative  Genitourinary: Negative  Active Problem List     Patient Active Problem List   Diagnosis    Slurred speech    Allergic rhinitis due to pollen    Anxiety    Benign essential hypertension    Bilateral low back pain with right-sided sciatica    Cervical radiculopathy at C6    Depression    Graves' disease    Hyperthyroidism    Hypertriglyceridemia    Neck pain    Osteoarthritis of hip    Recurrent cold sores    Vitamin D deficiency    Neuropathy of right lower extremity    Mild intermittent asthma without complication    Cushing's syndrome (HCC)    Insomnia    Transaminitis    Elevated blood sugar    Undifferentiated connective tissue disease (HCC)       Objective   /78 (BP Location: Left arm, Patient Position: Sitting, Cuff Size: Adult)   Pulse 83   Temp (!) 97 3 °F (36 3 °C) (Temporal)   Resp 16   Ht 5' 7 72" (1 72 m)   LMP 02/28/2019   SpO2 97%   BMI 33 42 kg/m²     Physical Exam  Cardiovascular:      Rate and Rhythm: Normal rate and regular rhythm  Heart sounds: Normal heart sounds  Comments: Carotids: no bruits  Ext: no edema  Pulmonary:      Effort: Pulmonary effort is normal  No respiratory distress  Breath sounds: No wheezing or rales  Psychiatric:         Behavior: Behavior normal          Thought Content:  Thought content normal          Pertinent Laboratory/Diagnostic Studies:  labs reviewed    Current Medications     Current Outpatient Medications:     Calcium 500-100 MG-UNIT CHEW, Calcium 500, Disp: , Rfl:     clobetasol (TEMOVATE) 0 05 % ointment, Apply topically 2 (two) times a day, Disp: , Rfl: 3    fexofenadine (ALLEGRA ALLERGY) 180 MG tablet, Take 180 mg by mouth Daily , Disp: , Rfl:     hydroxychloroquine (PLAQUENIL) 200 mg tablet, Take 200 mg by mouth 2 (two) times a day with meals , Disp: , Rfl:     lisinopril (ZESTRIL) 10 mg tablet, TAKE 1 TABLET DAILY, Disp: 90 tablet, Rfl: 3    meclizine (ANTIVERT) 25 mg tablet, 25 mg every 8 (eight) hours as needed  , Disp: , Rfl:     Multiple Vitamins-Minerals (MULTIVITAMIN ADULT EXTRA C PO), one daily, Disp: , Rfl:     nortriptyline (PAMELOR) 25 mg capsule, Take 1 capsule (25 mg total) by mouth daily at bedtime, Disp: 30 capsule, Rfl: 2    pregabalin (LYRICA) 75 mg capsule, Take 1 capsule (75 mg total) by mouth 2 (two) times a day, Disp: 180 capsule, Rfl: 1    diclofenac sodium (VOLTAREN) 50 mg EC tablet, Take 1 tablet (50 mg total) by mouth 2 (two) times a day, Disp: 180 tablet, Rfl: 1    EPINEPHrine (EpiPen 2-Ralph) 0 3 mg/0 3 mL SOAJ, Inject 0 3 mL (0 3 mg total) into a muscle once for 1 dose, Disp: 0 6 mL, Rfl: 2    TiZANidine (Zanaflex) 4 MG capsule, Take 1 capsule (4 mg total) by mouth 3 (three) times a day as needed for muscle spasms (Patient not taking: Reported on 2/28/2022 ), Disp: 90 capsule, Rfl: 0    valACYclovir (VALTREX) 1,000 mg tablet, TAKE 1 TABLET TWICE A DAY FOR 24 DAYS , Disp: 48 tablet, Rfl: 14    Health Maintenance     Health Maintenance   Topic Date Due    Hepatitis C Screening  Never done    COVID-19 Vaccine (1) Never done    Pneumococcal Vaccine: Pediatrics (0 to 5 Years) and At-Risk Patients (6 to 59 Years) (1 of 2 - PPSV23) Never done    HIV Screening  Never done    BMI: Adult  Never done    DTaP,Tdap,and Td Vaccines (1 - Tdap) Never done    Osteoporosis Screening  Never done    Breast Cancer Screening: Mammogram  06/07/2020    Influenza Vaccine (1) Never done    Annual Physical  02/28/2023    Cervical Cancer Screening  07/29/2025    Colorectal Cancer Screening  06/05/2029    HIB Vaccine  Aged Out    Hepatitis B Vaccine  Aged Out    IPV Vaccine  Aged Out    Hepatitis A Vaccine  Aged Out    Meningococcal ACWY Vaccine  Aged Out    HPV Vaccine  Aged Out     There is no immunization history for the selected administration types on file for this patient      Ruddy Fabry, DO  [facility]

## 2022-02-28 NOTE — PROGRESS NOTES
[facility]      NAME: Ana Rosa Valderrama  AGE: 45 y o  SEX: female  : 1970   MRN: [id number]    DATE: 2022  TIME: 5:14 PM    Assessment and Plan     Problem List Items Addressed This Visit     Benign essential hypertension    Graves' disease    Arthritis    Neuropathy of right lower extremity    Insomnia    Transaminitis    Elevated blood sugar      Other Visit Diagnoses     Well adult exam    -  Primary    Encounter for screening mammogram for malignant neoplasm of breast              Rx given for fasting blood work  Will call with results    Return to office in:  6 months    Chief Complaint     Chief Complaint   Patient presents with    Physical Exam       History of Present Illness     HPI    The following portions of the patient's history were reviewed and updated as appropriate: allergies, current medications, past family history, past medical history, past social history, past surgical history and problem list     Review of Systems   Review of Systems   Respiratory: Negative  Cardiovascular: Negative  Gastrointestinal: Negative  Genitourinary: Negative          Active Problem List     Patient Active Problem List   Diagnosis    Slurred speech    Allergic rhinitis due to pollen    Anxiety    Benign essential hypertension    Bilateral low back pain with right-sided sciatica    Cervical radiculopathy at C6    Depression    Graves' disease    Hyperthyroidism    Hypertriglyceridemia    Neck pain    Osteoarthritis of hip    Recurrent cold sores    Vitamin D deficiency    Arthritis    Neuropathy of right lower extremity    Mild intermittent asthma without complication    Cushing's syndrome (HCC)    Insomnia    Transaminitis    Elevated blood sugar       Objective   /78 (BP Location: Left arm, Patient Position: Sitting, Cuff Size: Adult)   Pulse 83   Temp (!) 97 3 °F (36 3 °C) (Temporal)   Resp 16   Ht 5' 7 72" (1 72 m)   LMP 2019   SpO2 97% BMI 33 42 kg/m²     Physical Exam  Cardiovascular:      Rate and Rhythm: Normal rate and regular rhythm  Heart sounds: Normal heart sounds  Comments: Carotids: no bruits  Ext: no edema  Pulmonary:      Effort: Pulmonary effort is normal  No respiratory distress  Breath sounds: No wheezing or rales  Psychiatric:         Behavior: Behavior normal          Thought Content:  Thought content normal          Pertinent Laboratory/Diagnostic Studies:  Labs from 6/5/2021 reviewed    Current Medications     Current Outpatient Medications:     Calcium 500-100 MG-UNIT CHEW, Calcium 500, Disp: , Rfl:     clobetasol (TEMOVATE) 0 05 % ointment, Apply topically 2 (two) times a day, Disp: , Rfl: 3    fexofenadine (ALLEGRA ALLERGY) 180 MG tablet, Take 180 mg by mouth Daily , Disp: , Rfl:     hydroxychloroquine (PLAQUENIL) 200 mg tablet, Take 200 mg by mouth 2 (two) times a day with meals , Disp: , Rfl:     lisinopril (ZESTRIL) 10 mg tablet, TAKE 1 TABLET DAILY, Disp: 90 tablet, Rfl: 3    meclizine (ANTIVERT) 25 mg tablet, 25 mg every 8 (eight) hours as needed  , Disp: , Rfl:     Multiple Vitamins-Minerals (MULTIVITAMIN ADULT EXTRA C PO), one daily, Disp: , Rfl:     nortriptyline (PAMELOR) 25 mg capsule, Take 1 capsule (25 mg total) by mouth daily at bedtime, Disp: 30 capsule, Rfl: 2    pregabalin (LYRICA) 75 mg capsule, Take 1 capsule (75 mg total) by mouth 2 (two) times a day, Disp: 180 capsule, Rfl: 1    diclofenac sodium (VOLTAREN) 50 mg EC tablet, Take 1 tablet (50 mg total) by mouth 2 (two) times a day, Disp: 180 tablet, Rfl: 1    EPINEPHrine (EpiPen 2-Ralph) 0 3 mg/0 3 mL SOAJ, Inject 0 3 mL (0 3 mg total) into a muscle once for 1 dose, Disp: 0 6 mL, Rfl: 2    Flaxseed, Linseed, (FLAXSEED OIL) 1000 MG CAPS, Take 1 capsule by mouth daily  (Patient not taking: Reported on 2/28/2022 ), Disp: , Rfl:     methimazole (TAPAZOLE) 5 mg tablet, Take 1 tablet by mouth daily   (Patient not taking: Reported on 8/26/2021), Disp: , Rfl:     predniSONE 20 mg tablet, Take 20 mg by mouth as needed (Patient not taking: Reported on 8/26/2021), Disp: , Rfl:     TiZANidine (Zanaflex) 4 MG capsule, Take 1 capsule (4 mg total) by mouth 3 (three) times a day as needed for muscle spasms (Patient not taking: Reported on 2/28/2022 ), Disp: 90 capsule, Rfl: 0    valACYclovir (VALTREX) 1,000 mg tablet, TAKE 1 TABLET TWICE A DAY FOR 24 DAYS , Disp: 48 tablet, Rfl: 14    Health Maintenance     Health Maintenance   Topic Date Due    Hepatitis C Screening  Never done    COVID-19 Vaccine (1) Never done    Pneumococcal Vaccine: Pediatrics (0 to 5 Years) and At-Risk Patients (6 to 59 Years) (1 of 2 - PPSV23) Never done    HIV Screening  Never done    BMI: Adult  Never done    DTaP,Tdap,and Td Vaccines (1 - Tdap) Never done    Osteoporosis Screening  Never done    Breast Cancer Screening: Mammogram  06/07/2020    Influenza Vaccine (1) Never done    Annual Physical  02/28/2023    Cervical Cancer Screening  07/29/2025    Colorectal Cancer Screening  06/05/2029    HIB Vaccine  Aged Out    Hepatitis B Vaccine  Aged Out    IPV Vaccine  Aged Out    Hepatitis A Vaccine  Aged Out    Meningococcal ACWY Vaccine  Aged Out    HPV Vaccine  Aged Out     There is no immunization history for the selected administration types on file for this patient      Lissette Birmingham DO  [facility]

## 2022-02-28 NOTE — ASSESSMENT & PLAN NOTE
History of mild transaminitis    Most recent labs from February 26 were normal   Will continue to follow

## 2022-02-28 NOTE — ASSESSMENT & PLAN NOTE
Symptoms have been stable on hydroxychloroquine    Most recent CRP and sed rates are normal   Continue follow-up with rheumatologist

## 2022-04-18 ENCOUNTER — TELEPHONE (OUTPATIENT)
Dept: ADMINISTRATIVE | Facility: OTHER | Age: 52
End: 2022-04-18

## 2022-04-18 NOTE — TELEPHONE ENCOUNTER
----- Message from Nazario Akbar MA sent at 4/18/2022  9:30 AM EDT -----  Regarding: Care Gap Request  04/18/22 9:30 AM    Hello, our patient Danni Mcdonald has had Mammogram completed/performed  Please assist in updating the patient chart by pulling the Care Everywhere (CE) document  The date of service is 3/21/22       Thank you,  Nazario Akbar MA  [facility]

## 2022-04-18 NOTE — TELEPHONE ENCOUNTER
Upon review of the In Basket request we were able to locate, review, and update the patient chart as requested for Mammogram     Any additional questions or concerns should be emailed to the Practice Liaisons via [email or web address] email, please do not reply via In Basket      Thank you  Jorge Pelaez

## 2022-07-29 ENCOUNTER — OFFICE VISIT (OUTPATIENT)
Dept: LAB | Facility: HOSPITAL | Age: 52
End: 2022-07-29
Payer: COMMERCIAL

## 2022-07-29 DIAGNOSIS — J34.89 OVERDEVELOPMENT OF NASAL BONES: ICD-10-CM

## 2022-07-29 DIAGNOSIS — J34.2 DEVIATED NASAL SEPTUM: ICD-10-CM

## 2022-07-29 DIAGNOSIS — J34.3 HYPERTROPHY OF NASAL TURBINATES: ICD-10-CM

## 2022-07-29 DIAGNOSIS — Z01.818 PRE-OPERATIVE EXAMINATION: ICD-10-CM

## 2022-07-29 LAB
ATRIAL RATE: 68 BPM
P AXIS: 68 DEGREES
PR INTERVAL: 188 MS
QRS AXIS: 57 DEGREES
QRSD INTERVAL: 86 MS
QT INTERVAL: 384 MS
QTC INTERVAL: 408 MS
T WAVE AXIS: 68 DEGREES
VENTRICULAR RATE: 68 BPM

## 2022-07-29 PROCEDURE — 93005 ELECTROCARDIOGRAM TRACING: CPT

## 2022-07-29 PROCEDURE — 93010 ELECTROCARDIOGRAM REPORT: CPT | Performed by: INTERNAL MEDICINE

## 2022-08-03 RX ORDER — METHOCARBAMOL 750 MG/1
750 TABLET, FILM COATED ORAL 3 TIMES DAILY
COMMUNITY
End: 2022-08-29

## 2022-08-03 RX ORDER — VALACYCLOVIR HYDROCHLORIDE 1 G/1
1000 TABLET, FILM COATED ORAL 2 TIMES DAILY
COMMUNITY

## 2022-08-03 RX ORDER — ASPIRIN 81 MG/1
81 TABLET ORAL DAILY
COMMUNITY

## 2022-08-03 RX ORDER — CETIRIZINE HYDROCHLORIDE 10 MG/1
10 TABLET ORAL DAILY
COMMUNITY

## 2022-08-03 NOTE — PRE-PROCEDURE INSTRUCTIONS
Pre-Surgery Instructions:   Medication Instructions    aspirin (ECOTRIN LOW STRENGTH) 81 mg EC tablet Stop taking 7 days prior to surgery   Calcium Carbonate (CALCIUM 500 PO) Stop taking 7 days prior to surgery   cetirizine (ZyrTEC) 10 mg tablet Hold day of surgery   Cholecalciferol 50 MCG (2000 UT) CAPS Stop taking 7 days prior to surgery   clobetasol (TEMOVATE) 0 05 % ointment Hold day of surgery   diclofenac sodium (VOLTAREN) 50 mg EC tablet Stop taking 3 days prior to surgery   EPINEPHrine (EpiPen 2-Ralph) 0 3 mg/0 3 mL SOAJ Instructions provided by MD    FIBER PO Stop taking 7 days prior to surgery   hydroxychloroquine (PLAQUENIL) 200 mg tablet Take day of surgery   lisinopril (ZESTRIL) 10 mg tablet Hold day of surgery   meclizine (ANTIVERT) 25 mg tablet Uses PRN- OK to take day of surgery    methocarbamol (ROBAXIN) 750 mg tablet Take day of surgery   Multiple Vitamins-Minerals (MULTIVITAMIN ADULT EXTRA C PO) Stop taking 7 days prior to surgery   Multiple Vitamins-Minerals (VISION VITAMINS PO) Stop taking 7 days prior to surgery   pregabalin (LYRICA) 75 mg capsule Take day of surgery   valACYclovir (VALTREX) 1,000 mg tablet Uses PRN- OK to take day of surgery   Have you had / have a sore throat? No  Have you had / have a cough less than 1 week? No  Have you had / have a fever greater than 100 0 - 100  4? No  Are you experiencing any shortness of breath? No    Review with patient via phone medications and showering instructions  Instructed to avoid all ASA and OTC Vit/Supp 1 week prior to surgery and to avoid NSAIDs 3 days prior to surgery per anesthesia instructions  Tylenol ok to take prn  [facility] call with surgery schedule time, nothing eat or drink after midnight  Verbalized understanding

## 2022-08-09 ENCOUNTER — ANESTHESIA EVENT (OUTPATIENT)
Dept: PERIOP | Facility: HOSPITAL | Age: 52
End: 2022-08-09
Payer: COMMERCIAL

## 2022-08-11 ENCOUNTER — ANESTHESIA (OUTPATIENT)
Dept: PERIOP | Facility: HOSPITAL | Age: 52
End: 2022-08-11
Payer: COMMERCIAL

## 2022-08-11 ENCOUNTER — HOSPITAL ENCOUNTER (OUTPATIENT)
Facility: HOSPITAL | Age: 52
Setting detail: OUTPATIENT SURGERY
Discharge: HOME | End: 2022-08-11
Attending: OTOLARYNGOLOGY | Admitting: OTOLARYNGOLOGY
Payer: COMMERCIAL

## 2022-08-11 VITALS
HEIGHT: 68 IN | HEART RATE: 58 BPM | SYSTOLIC BLOOD PRESSURE: 127 MMHG | DIASTOLIC BLOOD PRESSURE: 59 MMHG | RESPIRATION RATE: 16 BRPM | BODY MASS INDEX: 29.77 KG/M2 | OXYGEN SATURATION: 95 % | WEIGHT: 196.43 LBS | TEMPERATURE: 97.3 F

## 2022-08-11 DIAGNOSIS — J34.89 EMPTY NOSE SYNDROME: Chronic | ICD-10-CM

## 2022-08-11 DIAGNOSIS — J34.89 NASAL OBSTRUCTION: Primary | ICD-10-CM

## 2022-08-11 DIAGNOSIS — J34.2 DEVIATED NASAL SEPTUM: ICD-10-CM

## 2022-08-11 PROCEDURE — 30465 REPAIR NASAL STENOSIS: CPT | Performed by: OTOLARYNGOLOGY

## 2022-08-11 PROCEDURE — 30520 REPAIR OF NASAL SEPTUM: CPT | Performed by: OTOLARYNGOLOGY

## 2022-08-11 PROCEDURE — 30400 RECONSTRUCTION OF NOSE: CPT | Performed by: OTOLARYNGOLOGY

## 2022-08-11 RX ORDER — MAGNESIUM HYDROXIDE 1200 MG/15ML
LIQUID ORAL AS NEEDED
Status: DISCONTINUED | OUTPATIENT
Start: 2022-08-11 | End: 2022-08-11 | Stop reason: HOSPADM

## 2022-08-11 RX ORDER — SODIUM CHLORIDE, SODIUM LACTATE, POTASSIUM CHLORIDE, CALCIUM CHLORIDE 600; 310; 30; 20 MG/100ML; MG/100ML; MG/100ML; MG/100ML
125 INJECTION, SOLUTION INTRAVENOUS CONTINUOUS
Status: DISCONTINUED | OUTPATIENT
Start: 2022-08-11 | End: 2022-08-11 | Stop reason: HOSPADM

## 2022-08-11 RX ORDER — FENTANYL CITRATE/PF 50 MCG/ML
25 SYRINGE (ML) INJECTION
Status: DISCONTINUED | OUTPATIENT
Start: 2022-08-11 | End: 2022-08-11 | Stop reason: HOSPADM

## 2022-08-11 RX ORDER — HYDROMORPHONE HCL/PF 1 MG/ML
0.5 SYRINGE (ML) INJECTION
Status: DISCONTINUED | OUTPATIENT
Start: 2022-08-11 | End: 2022-08-11 | Stop reason: HOSPADM

## 2022-08-11 RX ORDER — PROPOFOL 10 MG/ML
INJECTION, EMULSION INTRAVENOUS AS NEEDED
Status: DISCONTINUED | OUTPATIENT
Start: 2022-08-11 | End: 2022-08-11

## 2022-08-11 RX ORDER — ONDANSETRON 2 MG/ML
INJECTION INTRAMUSCULAR; INTRAVENOUS AS NEEDED
Status: DISCONTINUED | OUTPATIENT
Start: 2022-08-11 | End: 2022-08-11

## 2022-08-11 RX ORDER — DEXAMETHASONE SODIUM PHOSPHATE 10 MG/ML
INJECTION, SOLUTION INTRAMUSCULAR; INTRAVENOUS AS NEEDED
Status: DISCONTINUED | OUTPATIENT
Start: 2022-08-11 | End: 2022-08-11

## 2022-08-11 RX ORDER — GLYCOPYRROLATE 0.2 MG/ML
INJECTION INTRAMUSCULAR; INTRAVENOUS AS NEEDED
Status: DISCONTINUED | OUTPATIENT
Start: 2022-08-11 | End: 2022-08-11

## 2022-08-11 RX ORDER — FENTANYL CITRATE 50 UG/ML
INJECTION, SOLUTION INTRAMUSCULAR; INTRAVENOUS AS NEEDED
Status: DISCONTINUED | OUTPATIENT
Start: 2022-08-11 | End: 2022-08-11

## 2022-08-11 RX ORDER — DEXMEDETOMIDINE HYDROCHLORIDE 100 UG/ML
INJECTION, SOLUTION INTRAVENOUS AS NEEDED
Status: DISCONTINUED | OUTPATIENT
Start: 2022-08-11 | End: 2022-08-11

## 2022-08-11 RX ORDER — OXYCODONE HCL 5 MG/5 ML
5 SOLUTION, ORAL ORAL EVERY 4 HOURS PRN
Status: DISCONTINUED | OUTPATIENT
Start: 2022-08-11 | End: 2022-08-11 | Stop reason: HOSPADM

## 2022-08-11 RX ORDER — OXYCODONE HYDROCHLORIDE 5 MG/1
5 TABLET ORAL EVERY 4 HOURS PRN
Qty: 10 TABLET | Refills: 0 | Status: SHIPPED | OUTPATIENT
Start: 2022-08-11 | End: 2022-08-29

## 2022-08-11 RX ORDER — CEFAZOLIN SODIUM 1 G/3ML
INJECTION, POWDER, FOR SOLUTION INTRAMUSCULAR; INTRAVENOUS AS NEEDED
Status: DISCONTINUED | OUTPATIENT
Start: 2022-08-11 | End: 2022-08-11

## 2022-08-11 RX ORDER — HYDROMORPHONE HCL/PF 1 MG/ML
SYRINGE (ML) INJECTION AS NEEDED
Status: DISCONTINUED | OUTPATIENT
Start: 2022-08-11 | End: 2022-08-11

## 2022-08-11 RX ORDER — GINSENG 100 MG
CAPSULE ORAL AS NEEDED
Status: DISCONTINUED | OUTPATIENT
Start: 2022-08-11 | End: 2022-08-11 | Stop reason: HOSPADM

## 2022-08-11 RX ORDER — PROPOFOL 10 MG/ML
INJECTION, EMULSION INTRAVENOUS CONTINUOUS PRN
Status: DISCONTINUED | OUTPATIENT
Start: 2022-08-11 | End: 2022-08-11

## 2022-08-11 RX ORDER — OXYMETAZOLINE HYDROCHLORIDE 0.05 G/100ML
SPRAY NASAL AS NEEDED
Status: DISCONTINUED | OUTPATIENT
Start: 2022-08-11 | End: 2022-08-11 | Stop reason: HOSPADM

## 2022-08-11 RX ORDER — CEFAZOLIN SODIUM 2 G/50ML
2000 SOLUTION INTRAVENOUS ONCE
Status: DISCONTINUED | OUTPATIENT
Start: 2022-08-11 | End: 2022-08-11 | Stop reason: HOSPADM

## 2022-08-11 RX ORDER — LIDOCAINE HYDROCHLORIDE AND EPINEPHRINE 10; 10 MG/ML; UG/ML
INJECTION, SOLUTION INFILTRATION; PERINEURAL AS NEEDED
Status: DISCONTINUED | OUTPATIENT
Start: 2022-08-11 | End: 2022-08-11 | Stop reason: HOSPADM

## 2022-08-11 RX ORDER — MEPERIDINE HYDROCHLORIDE 25 MG/ML
12.5 INJECTION INTRAMUSCULAR; INTRAVENOUS; SUBCUTANEOUS
Status: DISCONTINUED | OUTPATIENT
Start: 2022-08-11 | End: 2022-08-11 | Stop reason: HOSPADM

## 2022-08-11 RX ORDER — NEOSTIGMINE METHYLSULFATE 1 MG/ML
INJECTION INTRAVENOUS AS NEEDED
Status: DISCONTINUED | OUTPATIENT
Start: 2022-08-11 | End: 2022-08-11

## 2022-08-11 RX ORDER — ACETAMINOPHEN 160 MG/5ML
650 SUSPENSION, ORAL (FINAL DOSE FORM) ORAL ONCE
Status: DISCONTINUED | OUTPATIENT
Start: 2022-08-11 | End: 2022-08-11 | Stop reason: HOSPADM

## 2022-08-11 RX ORDER — OXYMETAZOLINE HYDROCHLORIDE 0.05 G/100ML
2 SPRAY NASAL
Status: ACTIVE | OUTPATIENT
Start: 2022-08-11 | End: 2022-08-11

## 2022-08-11 RX ORDER — ONDANSETRON 2 MG/ML
4 INJECTION INTRAMUSCULAR; INTRAVENOUS ONCE AS NEEDED
Status: COMPLETED | OUTPATIENT
Start: 2022-08-11 | End: 2022-08-11

## 2022-08-11 RX ORDER — VECURONIUM BROMIDE 1 MG/ML
INJECTION, POWDER, LYOPHILIZED, FOR SOLUTION INTRAVENOUS AS NEEDED
Status: DISCONTINUED | OUTPATIENT
Start: 2022-08-11 | End: 2022-08-11

## 2022-08-11 RX ORDER — MIDAZOLAM HYDROCHLORIDE 2 MG/2ML
INJECTION, SOLUTION INTRAMUSCULAR; INTRAVENOUS AS NEEDED
Status: DISCONTINUED | OUTPATIENT
Start: 2022-08-11 | End: 2022-08-11

## 2022-08-11 RX ADMIN — MIDAZOLAM 2 MG: 1 INJECTION INTRAMUSCULAR; INTRAVENOUS at 11:13

## 2022-08-11 RX ADMIN — PROPOFOL 130 MCG/KG/MIN: 10 INJECTION, EMULSION INTRAVENOUS at 11:17

## 2022-08-11 RX ADMIN — NEOSTIGMINE METHYLSULFATE 3 MG: 1 INJECTION INTRAVENOUS at 14:05

## 2022-08-11 RX ADMIN — FENTANYL CITRATE 25 MCG: 50 INJECTION, SOLUTION INTRAMUSCULAR; INTRAVENOUS at 14:40

## 2022-08-11 RX ADMIN — FENTANYL CITRATE 50 MCG: 50 INJECTION INTRAMUSCULAR; INTRAVENOUS at 11:17

## 2022-08-11 RX ADMIN — ONDANSETRON 4 MG: 2 INJECTION INTRAMUSCULAR; INTRAVENOUS at 14:31

## 2022-08-11 RX ADMIN — SODIUM CHLORIDE, SODIUM LACTATE, POTASSIUM CHLORIDE, AND CALCIUM CHLORIDE: .6; .31; .03; .02 INJECTION, SOLUTION INTRAVENOUS at 13:55

## 2022-08-11 RX ADMIN — VECURONIUM BROMIDE 7 MG: 1 INJECTION, POWDER, LYOPHILIZED, FOR SOLUTION INTRAVENOUS at 11:17

## 2022-08-11 RX ADMIN — LIDOCAINE HYDROCHLORIDE 50 MG: 20 INJECTION INTRAVENOUS at 11:17

## 2022-08-11 RX ADMIN — ONDANSETRON 4 MG: 2 INJECTION INTRAMUSCULAR; INTRAVENOUS at 13:49

## 2022-08-11 RX ADMIN — FENTANYL CITRATE 50 MCG: 50 INJECTION INTRAMUSCULAR; INTRAVENOUS at 11:45

## 2022-08-11 RX ADMIN — SODIUM CHLORIDE 0.3 MCG/KG/HR: 9 INJECTION, SOLUTION INTRAVENOUS at 11:56

## 2022-08-11 RX ADMIN — DEXMEDETOMIDINE HCL 16 MCG: 100 INJECTION INTRAVENOUS at 11:52

## 2022-08-11 RX ADMIN — VECURONIUM BROMIDE 2 MG: 1 INJECTION, POWDER, LYOPHILIZED, FOR SOLUTION INTRAVENOUS at 12:06

## 2022-08-11 RX ADMIN — DEXAMETHASONE SODIUM PHOSPHATE 10 MG: 10 INJECTION INTRAMUSCULAR; INTRAVENOUS at 11:17

## 2022-08-11 RX ADMIN — SODIUM CHLORIDE, SODIUM LACTATE, POTASSIUM CHLORIDE, AND CALCIUM CHLORIDE 125 ML/HR: .6; .31; .03; .02 INJECTION, SOLUTION INTRAVENOUS at 10:22

## 2022-08-11 RX ADMIN — GLYCOPYRROLATE 0.4 MG: 0.2 INJECTION, SOLUTION INTRAMUSCULAR; INTRAVENOUS at 14:05

## 2022-08-11 RX ADMIN — CEFAZOLIN SODIUM 2000 MG: 1 INJECTION, POWDER, FOR SOLUTION INTRAMUSCULAR; INTRAVENOUS at 11:29

## 2022-08-11 RX ADMIN — FENTANYL CITRATE 25 MCG: 50 INJECTION, SOLUTION INTRAMUSCULAR; INTRAVENOUS at 14:45

## 2022-08-11 RX ADMIN — PROPOFOL 150 MG: 10 INJECTION, EMULSION INTRAVENOUS at 11:17

## 2022-08-11 RX ADMIN — HYDROMORPHONE HYDROCHLORIDE 0.5 MG: 1 INJECTION, SOLUTION INTRAMUSCULAR; INTRAVENOUS; SUBCUTANEOUS at 12:08

## 2022-08-11 NOTE — DISCHARGE INSTRUCTIONS
KRYSTIN Pham  Facial Plastic & Reconstructive Surgery   Rhinoplasty Post-Operative Care  Office [phone] cell [phone]               At Home (in the days immediately following the procedure):     Try to sleep with your head elevated on 2-3 pillows   You may experience moderate bleeding from the nose--this is normal  The gauze dressing under your nose may be changed as necessary  Iced packs placed over the eyes will help reduce swelling  They should be used 20 minutes on/ 20 minutes off while awake for the first full day  Crushed ice in ziplock bags or frozen peas or corn work well  The outside and half inch inside each nostril may be cleaned with a Q-tip soaked in hydrogen peroxide  Apply antibiotic ointment (Bacitracin) or Vaseline to the first half inch inside your nose and the external incision 3-4 times per day  Take your medicines as prescribed  REMEMBER:  DO NOT DRIVE WHILE TAKING PAIN MEDICATIONS  You may shower with luke-warm water only (avoid getting the cast wet)  Do not blow your nose for 7-10 days  If you need to sneeze, do so with an open mouth  You may use ibuprofen (Motrin, Advil) and acetaminophen (Tyelnol) for pain control in addition to any prescribed pain medications  You may get out of bed and go to the bathroom with assistance  Bleeding should decrease over the first 24 hours; you may have bleeding when changing positions quickly  You should keep the rigid dressing covering the bridge of your nose as dry as possible  Your eyes may even swell shut  Eat light, soft meals as tolerated, avoiding gas-stimulating foods  Follow-up care:   Eat before coming to the office for post-operative visits  Rest for the first week after the procedure, avoiding excessive physical activities, hard chewing, lifting objects over 8 lbs (about the weight of a phone book), or bending over      Be very gentle when brushing your upper teeth as they may be numb after surgery  We request that you do not travel by plane for one week after surgery  Lubricate your nose as directed with Q-tips and Vaseline to soften hardened crusts  You can expect some light blood-tinged drainage from your nose for several days  If your nose begins to bleed copiously, spray or instill Afrin (generic = Oxymetazoline HCL) nose drops into the nostril that is bleeding and wait  If the bleeding continues for 5 minutes or clots expel and/or you begin to swallow blood, please call our office or on call surgeon at the numbers below  If there is excessive pain, high fever (greater than 101  5oF), or if you injure your nose, please call our office or on call surgeon at the numbers below  Follow-up visits: At one week, the cast/splints will be removed; you may drive yourself to this appointment (as long as you are no longer taking prescription/narcotic pain medicines)  After cast removal, make-up may be worn, avoiding the incision lines  Additional follow-up visits will be scheduled at this time  Note that final results may not be apparent until [place] after surgery  Healing Care:   After the cast is removed, avoid striking or bumping your nose; try not to roll onto it while asleep  Clean the external nasal skin gently but thoroughly with soap  The use of alcohol and tobacco products prolong swelling and healing and are best avoided for 2 weeks after surgery  Do not expose your nose to sun for 4-6 weeks after surgery  Use sunscreen (SPF 30 or higher) for 6 months after surgery if sun exposure is absolutely necessary  Avoid any physical exercise that can cause over-heating or over-exertion for two weeks after the surgery  Your nose may be swollen and stuffy for several months  Complete healing may take 12 months  It is to your advantage to return for all postoperative visits so that long-term results may be evaluated        Frequently Asked Questions:  When can I shower and shampoo my hair?   You may shower the day after your surgery, BUT KEEP THE NASAL CAST DRY  This may mean you wash your face/hair in the sink instead  It is important that you do not use hot water, as this can increase the swelling  Lukewarm water is best       When will the swelling and bruising go away? This usually takes 7-10 days or so, but may take less or more time, depending on the individual     When can I take aspirin? You should not take aspirin for 2 weeks prior to or after surgery  The same is true for vitamin E, ginko, garlic pills, and other natural supplements  When can I take ibuprofen? Non-steroidal anti-inflammatory drugs such as advil (ibuprofen), alleve (naproxen), or other similar may be used immediately after surgery as per the guidelines on the package  When can I wear my glasses? You will be able to wear contact lenses as soon as you feel comfortable  You may wear glasses one to two weeks after surgery, depending on the type of rhinoplasty you had  In any case, you must be careful not to place any pressure on the glasses (and thus your nose)  When can I wear makeup? Make-up can be applied after cast removal, but not directly on the incisions until 3 weeks after the procedure  When will I see my results? Final results in rhinoplasty can take 12 months  However, notable changes should be evident in the weeks immediately after the procedure  Typically, the 1-2 mm of residual swelling is what takes a variable amount of time to resolve, and can make subtle but significant differences in nasal shape  What about exercise? Please adhere to the following schedule:   Up to week 1 after surgery:  REST! No strenuous exercise  Walking is ok  Week 1-3 after surgery: You may begin light aerobic exercise, but no bending over/straining/lifting weights  Week 3+: You may begin more strenuous exercise, such as yoga, stretching, bending over, lifting weights    Please remember to start slowly  Week 6+: You may resume contact sports, such as soccer, basketball, etc    How to contact us:    Phone: If you have questions or concerns, please call us at [phone] during business hours (8 am to 5 pm)  On nights and weekends, you may page the ENT surgeon on call  at [facility]   In case of emergency, please call [phone]

## 2022-08-11 NOTE — ANESTHESIA PREPROCEDURE EVALUATION
Review of Systems/Medical History  Patient summary reviewed  Chart reviewed  No history of anesthetic complications     Cardiovascular  Negative cardio ROS Hyperlipidemia, Hypertension controlled,    Pulmonary  Negative pulmonary ROS Pneumonia, Asthma , well controlled/ stable Last rescue: < 1 week ago Asthma type of rescue: PRN inhaler, Shortness of breath,        GI/Hepatic  Negative GI/hepatic ROS   Bowel prep       Negative  ROS        Endo/Other  History of thyroid disease (grave's dz) , hyperthyroidism,      GYN  Negative gynecology ROS          Hematology  Negative hematology ROS      Musculoskeletal  Rheumatoid arthritis Severity: moderate, Back pain (radiculopathy) , cervical pain,   Arthritis     Neurology  Negative neurology ROS      Psychology   Anxiety, Depression ,              Physical Exam    Airway    Mallampati score: II  TM Distance: >3 FB  Neck ROM: full     Dental   No notable dental hx     Cardiovascular  Comment: Negative ROS, Rhythm: regular, Rate: normal, Cardiovascular exam normal    Pulmonary  Pulmonary exam normal Breath sounds clear to auscultation,     Other Findings        Anesthesia Plan  ASA Score- 2     Anesthesia Type- general with ASA Monitors  Additional Monitors:   Airway Plan: ETT  Plan Factors-    Chart reviewed  Existing labs reviewed  Patient summary reviewed  Patient is not a current smoker  Patient not instructed to abstain from smoking on day of procedure  Patient did not smoke on day of surgery  There is medical exclusion for perioperative obstructive sleep apnea risk education  Induction- intravenous  Postoperative Plan- Plan for postoperative opioid use  Informed Consent- Anesthetic plan and risks discussed with patient

## 2022-08-11 NOTE — OP NOTE
OPERATIVE REPORT  PATIENT NAME: Kiel Coon    :  1970  MRN: [id number]  Pt Location: [facility] OR ROOM 04    SURGERY DATE: 2022    Surgeon(s) and Role:     * Hank Carrillo MD - Primary     * Adriana Khan MD - Assisting    PREOPERATIVE DIAGNOSES:  1  Nasal valve stenosis with obstruction  2  Septal deviation with nasal obstruction  3  Empty nose syndrome  4  Nasal deformity    POSTOPERATIVE DIAGNOSES:  Same    PROCEDURES:  1  Open repair of nasal stenosis  2  Septoplasty  3  Septal cartilage graft to nose  4  Rhinoplasty    SURGEON:  Margo Bal MD    ASSISTANTS: Adriana Khan MD    ANESTHESIA:  General     ESTIMATED BLOOD LOSS:  < 50 cc    FLUIDS:  Crystalloid    COMPLICATIONS:  None  FINDINGS:  Total cosmetic time for the procedure was 45 minutes  Previous resection of bilateral LLC Left: 5 mm remaining laterally and 4 mm at the intermediate manjeet  Right: 7 mm remaining laterally and 4 mm at the intermediate manjeet  INDICATIONS FOR PROCEDURE:  This is a 47y o  year old female whom I've seen previously in consultation regarding nasal obstruction and desire to change shape of the nose  Risks and benefits of the above procedures were discussed at length, including but not limited to bleeding, infection, external or internal nasal scars, septal perforation, change in the shape of the nose, undesired aesthetic outcome, nasal obstruction, among others  Patient understands and consents to proceed  BODY OF REPORT:   The patient was brought to the procedure room, placed on the procedure table in supine position  General  anesthesia was induced without complication  The table was rotated  The nose was injected with a mixture of 1% lidocaine with 1:100,000 of epinephrine in the tip, columella, dorsum, alar bases bilaterally, and septum bilaterally  An afrin-soaked pledget was placed in each nostril  The face was prepped and draped in the usual sterile fashion   The eyelids were gently taped sterilely after removing any prep from the lid skin  A standard timeout was taken to confirm patient name, procedures, sidedness, allergies, medications administered among others  Septoplasty was performed as follows: A left-sided hemitransfixion incision was made with a #15 blade  A submucoperichondrial plane was elevated posteriorly to the bony septum, inferiorly to the floor of the nose, and superiorly to the dorsum  Dissection was performed around the anterocaudal septum in the same plane, and the opposite side was similarly exposed  Repair of nasal vestibular stenosis was performed as follows: An inverted gull-wing incision was made at the mid-columellar level using a 6700 blade, in continuity with incisions 1-2 mm below the inferior margin of the lower lateral cartilages intranasally  The nose was decorticated in a sub-SNAS plane using scissors, to the level of the rhinion  At this level, a McKenty elevator was used to dissect subperiosteally to the nasofrontal suture  Notably there was less than 5 mm of septum removed previously in the central portion of the septum, but the dorsal strut had at least 6 mm resected resulting in significant loss of support of the  ULC  Rhinoplasty was performed as follows: The upper lateral cartilages were released from the septum  The bony dorsum was addressed using a (pull rasp/Olivas osteotome)  Further fine rasping was performed using a #3 pull rasp  Irrigation with saline was performed to remove any bony dust   The septum was then addressed  Careful comparison to the preoperative planning photographs was undertaken at each of these steps  Septoplasty was continued as follows: An incision was made 1 0 cm from the caudal border of the septum at the maxillary crest  This was carried to the dorsal septum with a 1 5 cm dorsal strut remaining to form an L-shaped strut of cartilage to preserve septal support for the tip support   Care was taken to preserve the connection to the caudal septum to the lower lateral cartilages bilaterally  Dissection was performed septum in the same plane on the opposite side  Cartilaginous septum was removed  Bony deviations were removed using through biting instrumentation  Repair of nasal vestibular stenosis was continued: Auto  grafts were created from ULC by incising the redundant ULC from the previous resection of the dorsal strut reducing the significant dorsal defect  The upper lateral cartilages were then repaired to the septum directly with 5-0 PDS  Rotational lateral crural steal was performed due to the above over resection of the lateral manjeet in order to increase support  The tip was secured with interrupted 5-0 Prolene suture  Tongue-in-groove setback of the medial crura was achieved to support the tip and adjust rotation, using 5-0 Prolene  Bilateral lateral crural strut grafts were created from the septal cartilage graft  The sub-SNAS plane was dissected to the piriform aperture  The LLC was dissected along its caudal edge to expose the undersurface, whereby the upper 3-4 mm was dissected  The grafts were placed under this and extended over the bony aperture  Bilateral camouflage grafts were placed from morselized septal cartilage  She  was checked for contour and tip support  She  was found to have good contour and symmetry from all angles, including lateral, frontal and base view  Tip support was good  The hemitransfixion incision was closed using multiple interrupted 5-0 chromic sutures  The septal space was closed using a mattressed 4-0 plane gut suture on a Ken needle  The skin flap was replaced and contour verified  The columella was closed using multiple interrupted 6-0 Prolene sutures  Intranasal incisions were carefully sutured using  5-0 chromic gut suture  Bilateral Vinson splints were placed, each coated in bacitracin    A 4-0 nylon was used to secure this through the septum anteriorly  An external thermoplastic splint was placed after taping the nose  The patient was returned to the care of Anesthesia, extubated without difficulty, and taken to the recovery area in stable condition  All instruments and sponge counts were correct at the end of the procedure  Jesus Brady MD, was present for and performed all key elements of the procedure         I was present for the entire procedure    Patient Disposition:  PACU  and extubated and stable      SIGNATURE: Jose Valles MD  DATE: August 11, 2022  TIME: 2:15 PM

## 2022-08-11 NOTE — ANESTHESIA POSTPROCEDURE EVALUATION
Post-Op Assessment Note    CV Status:  Stable  Pain Score: 0    Pain management: adequate     Mental Status:  Alert and awake   Hydration Status:  Euvolemic   PONV Controlled:  Controlled   Airway Patency:  Patent  Airway: intubated   Two or more mitigation strategies used for obstructive sleep apnea   Post Op Vitals Reviewed: Yes      Staff: Anesthesiologist, CRNA         No complications documented      BP      Temp     Pulse    Resp      SpO2      /55   Pulse (!) 51   Temp (!) 97 3 °F (36 3 °C) (Temporal)   Resp 16   Ht 5' 8" (1 727 m)   Wt 89 1 kg (196 lb 6 9 oz)   LMP 07/21/2020 Comment: post menopausal since age 47  SpO2 95%   BMI 29 87 kg/m²

## 2022-08-29 ENCOUNTER — OFFICE VISIT (OUTPATIENT)
Dept: FAMILY MEDICINE CLINIC | Facility: CLINIC | Age: 52
End: 2022-08-29
Payer: COMMERCIAL

## 2022-08-29 VITALS
HEIGHT: 68 IN | TEMPERATURE: 97.8 F | DIASTOLIC BLOOD PRESSURE: 80 MMHG | OXYGEN SATURATION: 97 % | WEIGHT: 196 LBS | BODY MASS INDEX: 29.7 KG/M2 | HEART RATE: 80 BPM | RESPIRATION RATE: 16 BRPM | SYSTOLIC BLOOD PRESSURE: 120 MMHG

## 2022-08-29 DIAGNOSIS — I10 BENIGN ESSENTIAL HYPERTENSION: Primary | ICD-10-CM

## 2022-08-29 DIAGNOSIS — G57.91 NEUROPATHY OF RIGHT LOWER EXTREMITY: ICD-10-CM

## 2022-08-29 DIAGNOSIS — E78.1 HYPERTRIGLYCERIDEMIA: ICD-10-CM

## 2022-08-29 DIAGNOSIS — E05.00 GRAVES' DISEASE: ICD-10-CM

## 2022-08-29 DIAGNOSIS — R74.01 TRANSAMINITIS: ICD-10-CM

## 2022-08-29 DIAGNOSIS — R09.81 CHRONIC NASAL CONGESTION: ICD-10-CM

## 2022-08-29 DIAGNOSIS — M35.9 UNDIFFERENTIATED CONNECTIVE TISSUE DISEASE (HCC): ICD-10-CM

## 2022-08-29 DIAGNOSIS — R73.9 ELEVATED BLOOD SUGAR: ICD-10-CM

## 2022-08-29 PROCEDURE — 99214 OFFICE O/P EST MOD 30 MIN: CPT | Performed by: FAMILY MEDICINE

## 2022-08-29 RX ORDER — TIZANIDINE HYDROCHLORIDE 4 MG/1
CAPSULE, GELATIN COATED ORAL
Qty: 20 CAPSULE | Refills: 1 | Status: SHIPPED | OUTPATIENT
Start: 2022-08-29

## 2022-08-29 NOTE — ASSESSMENT & PLAN NOTE
Status post rhinoplasty  on August 11th by Dr Onofre Landeros  Patient states that she is much less congested since surgery

## 2022-08-29 NOTE — ASSESSMENT & PLAN NOTE
History of high triglycerides  Patient is due for labs  Will call with results    Continue low-fat diet and exercise

## 2022-08-29 NOTE — ASSESSMENT & PLAN NOTE
Stable on Lyrica 75 b i d  Methocarbamol has been ineffective  Will discontinue and switch to tizanidine 4 mg b i d    Call further problems

## 2022-08-29 NOTE — ASSESSMENT & PLAN NOTE
History of undifferentiated connective tissue disorder  Being followed by [facility] rheumatologist   Still taking hydroxychloroquine  Patient states methocarbamol ineffective  Will switch to tizanidine 4 mg b i d    Call further problems

## 2022-08-29 NOTE — PROGRESS NOTES
[facility]      NAME: Jessica Vinson  AGE: 47 y o  SEX: female  : 1970   MRN: [id number]    DATE: 2022  TIME: 5:26 PM    Assessment and Plan     Problem List Items Addressed This Visit     Benign essential hypertension - Primary     Blood pressure well controlled on lisinopril 10         Graves' disease     No longer on Tapazole  No longer being followed by endocrinology  Will check TSH in near future  Relevant Orders    TSH, 3rd generation with Free T4 reflex    Hypertriglyceridemia     History of high triglycerides  Patient is due for labs  Will call with results  Continue low-fat diet and exercise         Relevant Orders    Lipid Panel with Direct LDL reflex    Neuropathy of right lower extremity     Stable on Lyrica 75 b i d  Methocarbamol has been ineffective  Will discontinue and switch to tizanidine 4 mg b i d  Call further problems         Relevant Medications    TiZANidine (Zanaflex) 4 MG capsule    Transaminitis     LFTs have normalized on most recent labs  Will continue to monitor         Elevated blood sugar     Continue reduced carb diet and exercise  Will check labs in near future  Call with results         Relevant Orders    Hemoglobin A1C    Undifferentiated connective tissue disease ([facility] [address] )     History of undifferentiated connective tissue disorder  Being followed by [facility] rheumatologist   Still taking hydroxychloroquine  Patient states methocarbamol ineffective  Will switch to tizanidine 4 mg b i d  Call further problems         Chronic nasal congestion     Status post rhinoplasty  on  by Dr Monroe Lozano  Patient states that she is much less congested since surgery  Current with mammography  Next colonoscopy due     Patient refuses COVID vaccination due to allergic reaction  Discussed Shingrix (patient declines at this time)  Patient states she is current with tetanus (she will for date)      Orders placed for FBW (A1C, lipids, TSH)  Will call w/ results  Return to office in: 6 mos ? yearly labs    Chief Complaint     Chief Complaint   Patient presents with    Follow-up     6 months       History of Present Illness     Pt presents for recheck of chronic medical problems  Doing well s/p recent nasal surgery  The following portions of the patient's history were reviewed and updated as appropriate: allergies, current medications, past family history, past medical history, past social history, past surgical history and problem list     Review of Systems   Review of Systems   Respiratory: Negative  Cardiovascular: Negative  Gastrointestinal: Negative  Genitourinary: Negative  Active Problem List     Patient Active Problem List   Diagnosis    Slurred speech    Allergic rhinitis due to pollen    Anxiety    Benign essential hypertension    Bilateral low back pain with right-sided sciatica    Cervical radiculopathy at C6    Depression    Graves' disease    Hyperthyroidism    Hypertriglyceridemia    Neck pain    Osteoarthritis of hip    Recurrent cold sores    Vitamin D deficiency    Neuropathy of right lower extremity    Mild intermittent asthma without complication    Cushing's syndrome (HCC)    Insomnia    Transaminitis    Elevated blood sugar    Undifferentiated connective tissue disease (HCC)    Empty nose syndrome    Nasal obstruction    Deviated nasal septum    Chronic nasal congestion       Objective   /80 (BP Location: Left arm, Patient Position: Sitting, Cuff Size: Adult)   Pulse 80   Temp 97 8 °F (36 6 °C) (Temporal)   Resp 16   Ht 5' 7 72" (1 72 m)   Wt 88 9 kg (196 lb)   LMP 07/21/2020 Comment: post menopausal since age 47  SpO2 97%   BMI 30 05 kg/m²     Physical Exam  Cardiovascular:      Rate and Rhythm: Normal rate and regular rhythm  Heart sounds: Normal heart sounds        Comments: Carotids: no bruits  Ext: no edema  Pulmonary:      Effort: Pulmonary effort is normal  No respiratory distress  Breath sounds: No wheezing or rales  Psychiatric:         Behavior: Behavior normal          Thought Content:  Thought content normal          Pertinent Laboratory/Diagnostic Studies:  Labs were August 2nd reviewed    Current Medications     Current Outpatient Medications:     aspirin (ECOTRIN LOW STRENGTH) 81 mg EC tablet, Take 81 mg by mouth daily, Disp: , Rfl:     Calcium Carbonate (CALCIUM 500 PO), Take 1,000 mg by mouth daily after breakfast, Disp: , Rfl:     cetirizine (ZyrTEC) 10 mg tablet, Take 10 mg by mouth daily, Disp: , Rfl:     Cholecalciferol 50 MCG (2000 UT) CAPS, Take 1 capsule by mouth daily after breakfast, Disp: , Rfl:     clobetasol (TEMOVATE) 0 05 % ointment, Apply topically as needed, Disp: , Rfl: 3    FIBER PO, Take by mouth daily after breakfast, Disp: , Rfl:     hydroxychloroquine (PLAQUENIL) 200 mg tablet, Take 200 mg by mouth 2 (two) times a day with meals , Disp: , Rfl:     lisinopril (ZESTRIL) 10 mg tablet, TAKE 1 TABLET DAILY, Disp: 90 tablet, Rfl: 3    meclizine (ANTIVERT) 25 mg tablet, 25 mg every 8 (eight) hours as needed  , Disp: , Rfl:     Multiple Vitamins-Minerals (MULTIVITAMIN ADULT EXTRA C PO), one daily, Disp: , Rfl:     Multiple Vitamins-Minerals (VISION VITAMINS PO), Take by mouth, Disp: , Rfl:     pregabalin (LYRICA) 75 mg capsule, Take 1 capsule (75 mg total) by mouth 2 (two) times a day, Disp: 180 capsule, Rfl: 1    TiZANidine (Zanaflex) 4 MG capsule, 1 BID PRN, Disp: 20 capsule, Rfl: 1    valACYclovir (VALTREX) 1,000 mg tablet, Take 1,000 mg by mouth 2 (two) times a day, Disp: , Rfl:     diclofenac sodium (VOLTAREN) 50 mg EC tablet, Take 1 tablet (50 mg total) by mouth 2 (two) times a day (Patient taking differently: Take 50 mg by mouth as needed), Disp: 180 tablet, Rfl: 1    EPINEPHrine (EpiPen 2-Ralph) 0 3 mg/0 3 mL SOAJ, Inject 0 3 mL (0 3 mg total) into a muscle once for 1 dose, Disp: 0 6 mL, Rfl: 2    Health Maintenance     Health Maintenance   Topic Date Due    Hepatitis C Screening  Never done    COVID-19 Vaccine (1) Never done    Pneumococcal Vaccine: Pediatrics (0 to 5 Years) and At-Risk Patients (6 to 59 Years) (1 - PCV) Never done    HIV Screening  Never done    BMI: Followup Plan  Never done    DTaP,Tdap,and Td Vaccines (1 - Tdap) Never done    Osteoporosis Screening  Never done    Influenza Vaccine (1) 09/01/2022    Annual Physical  02/28/2023    Breast Cancer Screening: Mammogram  03/18/2023    BMI: Adult  08/29/2023    Cervical Cancer Screening  07/29/2025    Colorectal Cancer Screening  06/05/2029    HIB Vaccine  Aged Out    Hepatitis B Vaccine  Aged Out    IPV Vaccine  Aged Out    Hepatitis A Vaccine  Aged Out    Meningococcal ACWY Vaccine  Aged Out    HPV Vaccine  Aged Out     There is no immunization history for the selected administration types on file for this patient      Wolf Marin DO  [facility]

## 2022-10-19 DIAGNOSIS — G57.91 NEUROPATHY OF RIGHT LOWER EXTREMITY: ICD-10-CM

## 2022-10-19 RX ORDER — PREGABALIN 75 MG/1
75 CAPSULE ORAL 2 TIMES DAILY
Qty: 180 CAPSULE | Refills: 1 | Status: SHIPPED | OUTPATIENT
Start: 2022-10-19

## 2022-12-12 ENCOUNTER — OFFICE VISIT (OUTPATIENT)
Dept: FAMILY MEDICINE CLINIC | Facility: CLINIC | Age: 52
End: 2022-12-12

## 2022-12-12 VITALS
BODY MASS INDEX: 30.54 KG/M2 | HEIGHT: 67 IN | HEART RATE: 77 BPM | OXYGEN SATURATION: 96 % | SYSTOLIC BLOOD PRESSURE: 110 MMHG | DIASTOLIC BLOOD PRESSURE: 72 MMHG | TEMPERATURE: 98.9 F

## 2022-12-12 DIAGNOSIS — J06.9 VIRAL URI: Primary | ICD-10-CM

## 2022-12-12 NOTE — ASSESSMENT & PLAN NOTE
Increase fluid intake and get plenty of rest   symptoms appear consistent with viral infection  Rule out Flu/covid  If you have nasal congestions, post nasal drip, sinus pressure, runny nose you may try the following:        Clearing your sinuses in a nice steamy shower or in bathroom filled with steamy air  Nasal saline rinses every 1-2 hours while awake may also help decrease nasal congestion, drainage  You may try Mucinex D 12 hour version  1/2 to 1 tablet one to two times a day as needed for nasal congestion, runny nose, post nasal drip, or cough  If you have sore / scratch / irritated throat, you may try the following:          Warm salt water gargles every 1-2 hours while awake, throat lozenges, Tylenol and/or ibuprofen  Most upper respiratory symptoms start to improve after 7-10 days but may take a few weeks to completely resolve  You may also use Ibuprofen or Acetaminophen containing product for symptom relief  Follow up in 1 week if your symptoms persist or worsen  Please call the office if you have any questions  The patient verbalized understanding of treatment plan

## 2022-12-12 NOTE — PROGRESS NOTES
Assessment/Plan:    1  Viral URI  Assessment & Plan:  Increase fluid intake and get plenty of rest   symptoms appear consistent with viral infection  Rule out Flu/covid  If you have nasal congestions, post nasal drip, sinus pressure, runny nose you may try the following:        Clearing your sinuses in a nice steamy shower or in bathroom filled with steamy air  Nasal saline rinses every 1-2 hours while awake may also help decrease nasal congestion, drainage  You may try Mucinex D 12 hour version  1/2 to 1 tablet one to two times a day as needed for nasal congestion, runny nose, post nasal drip, or cough  If you have sore / scratch / irritated throat, you may try the following:          Warm salt water gargles every 1-2 hours while awake, throat lozenges, Tylenol and/or ibuprofen  Most upper respiratory symptoms start to improve after 7-10 days but may take a few weeks to completely resolve  You may also use Ibuprofen or Acetaminophen containing product for symptom relief  Follow up in 1 week if your symptoms persist or worsen  Please call the office if you have any questions  The patient verbalized understanding of treatment plan  Orders:  -     Covid/Flu- Office Collect      Subjective:      Patient ID: Lorrie Valenzuela is a 47 y o  female  HPI    Patient presenting with sick symptoms that started 2 days ago  Patient had fever last night of 101 F  She took mucinex and ibuprofen last and her fever did resolve today  Patient denies sick contacts  She feels very tired and has congestion with post-nasal drip  She had blood mixed with her nasal congestion when she blew her nose  Denies sore throat or colored mucous        The following portions of the patient's history were reviewed and updated as appropriate: allergies, current medications, past family history, past medical history, past social history, past surgical history, and problem list       Current Outpatient Medications:   •  aspirin (ECOTRIN LOW STRENGTH) 81 mg EC tablet, Take 81 mg by mouth daily, Disp: , Rfl:   •  Calcium Carbonate (CALCIUM 500 PO), Take 1,000 mg by mouth daily after breakfast, Disp: , Rfl:   •  cetirizine (ZyrTEC) 10 mg tablet, Take 10 mg by mouth daily, Disp: , Rfl:   •  Cholecalciferol 50 MCG (2000 UT) CAPS, Take 1 capsule by mouth daily after breakfast, Disp: , Rfl:   •  clobetasol (TEMOVATE) 0 05 % ointment, Apply topically as needed, Disp: , Rfl: 3  •  diclofenac sodium (VOLTAREN) 50 mg EC tablet, Take 1 tablet (50 mg total) by mouth 2 (two) times a day (Patient taking differently: Take 50 mg by mouth as needed), Disp: 180 tablet, Rfl: 1  •  EPINEPHrine (EpiPen 2-Ralph) 0 3 mg/0 3 mL SOAJ, Inject 0 3 mL (0 3 mg total) into a muscle once for 1 dose, Disp: 0 6 mL, Rfl: 2  •  FIBER PO, Take by mouth daily after breakfast, Disp: , Rfl:   •  hydroxychloroquine (PLAQUENIL) 200 mg tablet, Take 200 mg by mouth 2 (two) times a day with meals , Disp: , Rfl:   •  lisinopril (ZESTRIL) 10 mg tablet, TAKE 1 TABLET DAILY, Disp: 90 tablet, Rfl: 1  •  meclizine (ANTIVERT) 25 mg tablet, 25 mg every 8 (eight) hours as needed  , Disp: , Rfl:   •  Multiple Vitamins-Minerals (MULTIVITAMIN ADULT EXTRA C PO), one daily, Disp: , Rfl:   •  Multiple Vitamins-Minerals (VISION VITAMINS PO), Take by mouth, Disp: , Rfl:   •  pregabalin (LYRICA) 75 mg capsule, Take 1 capsule (75 mg total) by mouth 2 (two) times a day, Disp: 180 capsule, Rfl: 1  •  valACYclovir (VALTREX) 1,000 mg tablet, Take 1,000 mg by mouth 2 (two) times a day, Disp: , Rfl:   •  TiZANidine (Zanaflex) 4 MG capsule, 1 BID PRN (Patient not taking: Reported on 12/12/2022), Disp: 20 capsule, Rfl: 1      Review of Systems   Constitutional: Positive for chills, fatigue and fever  HENT: Positive for congestion, postnasal drip, rhinorrhea and sinus pressure  Negative for ear pain and sore throat  Eyes: Negative for pain and visual disturbance     Respiratory: Negative for cough and shortness of breath  Cardiovascular: Negative for chest pain and palpitations  Gastrointestinal: Negative for abdominal pain and vomiting  Genitourinary: Negative for dysuria and hematuria  Musculoskeletal: Negative for arthralgias and back pain  Skin: Negative for color change and rash  Neurological: Negative for seizures and syncope  All other systems reviewed and are negative  Objective:      /72 (BP Location: Left arm, Patient Position: Sitting, Cuff Size: Large)   Pulse 77   Temp 98 9 °F (37 2 °C) (Temporal)   Ht 5' 7" (1 702 m)   LMP 07/21/2020 Comment: post menopausal since age 47  SpO2 96%   BMI 30 54 kg/m²          Physical Exam  Vitals and nursing note reviewed  Constitutional:       General: She is not in acute distress  Appearance: Normal appearance  She is not ill-appearing or toxic-appearing  HENT:      Head: Normocephalic and atraumatic  Right Ear: Tympanic membrane, ear canal and external ear normal       Left Ear: Tympanic membrane, ear canal and external ear normal       Nose: Congestion present  Right Sinus: Maxillary sinus tenderness present  Left Sinus: Maxillary sinus tenderness present  Mouth/Throat:      Mouth: Mucous membranes are moist       Pharynx: Oropharynx is clear  No oropharyngeal exudate or posterior oropharyngeal erythema  Eyes:      Extraocular Movements: Extraocular movements intact  Conjunctiva/sclera: Conjunctivae normal       Pupils: Pupils are equal, round, and reactive to light  Cardiovascular:      Rate and Rhythm: Normal rate and regular rhythm  Heart sounds: Normal heart sounds  No murmur heard  Pulmonary:      Effort: Pulmonary effort is normal  No respiratory distress  Breath sounds: Normal breath sounds  No wheezing  Musculoskeletal:      Cervical back: Normal range of motion  Lymphadenopathy:      Cervical: Cervical adenopathy present     Neurological: General: No focal deficit present  Mental Status: She is alert and oriented to person, place, and time  Mental status is at baseline  Psychiatric:         Mood and Affect: Mood normal          Behavior: Behavior normal          Thought Content:  Thought content normal          Judgment: Judgment normal

## 2022-12-13 DIAGNOSIS — U07.1 COVID-19: Primary | ICD-10-CM

## 2022-12-13 LAB
FLUAV RNA RESP QL NAA+PROBE: NEGATIVE
FLUBV RNA RESP QL NAA+PROBE: NEGATIVE
SARS-COV-2 RNA RESP QL NAA+PROBE: POSITIVE

## 2022-12-13 RX ORDER — NIRMATRELVIR AND RITONAVIR 300-100 MG
3 KIT ORAL 2 TIMES DAILY
Qty: 30 TABLET | Refills: 0 | Status: SHIPPED | OUTPATIENT
Start: 2022-12-13 | End: 2022-12-18

## 2023-02-10 PROBLEM — J06.9 VIRAL URI: Status: RESOLVED | Noted: 2022-12-12 | Resolved: 2023-02-10

## 2023-02-28 LAB — HBA1C MFR BLD HPLC: 5.3 %

## 2023-03-02 ENCOUNTER — OFFICE VISIT (OUTPATIENT)
Dept: FAMILY MEDICINE CLINIC | Facility: CLINIC | Age: 53
End: 2023-03-02

## 2023-03-02 VITALS
BODY MASS INDEX: 30.61 KG/M2 | DIASTOLIC BLOOD PRESSURE: 80 MMHG | HEIGHT: 67 IN | HEART RATE: 88 BPM | TEMPERATURE: 97.7 F | OXYGEN SATURATION: 96 % | RESPIRATION RATE: 16 BRPM | SYSTOLIC BLOOD PRESSURE: 120 MMHG

## 2023-03-02 DIAGNOSIS — D68.61 ANTIPHOSPHOLIPID ANTIBODY SYNDROME (HCC): ICD-10-CM

## 2023-03-02 DIAGNOSIS — G57.91 NEUROPATHY OF RIGHT LOWER EXTREMITY: ICD-10-CM

## 2023-03-02 DIAGNOSIS — E24.9 CUSHING'S SYNDROME (HCC): ICD-10-CM

## 2023-03-02 DIAGNOSIS — E05.00 GRAVES' DISEASE: ICD-10-CM

## 2023-03-02 DIAGNOSIS — Z01.419 ENCOUNTER FOR WELL WOMAN EXAM WITH ROUTINE GYNECOLOGICAL EXAM: ICD-10-CM

## 2023-03-02 DIAGNOSIS — I10 BENIGN ESSENTIAL HYPERTENSION: Primary | ICD-10-CM

## 2023-03-02 DIAGNOSIS — Z86.19 H/O COLD SORES: ICD-10-CM

## 2023-03-02 DIAGNOSIS — M35.9 UNDIFFERENTIATED CONNECTIVE TISSUE DISEASE (HCC): ICD-10-CM

## 2023-03-02 DIAGNOSIS — R74.01 TRANSAMINITIS: ICD-10-CM

## 2023-03-02 DIAGNOSIS — Z12.31 ENCOUNTER FOR SCREENING MAMMOGRAM FOR MALIGNANT NEOPLASM OF BREAST: ICD-10-CM

## 2023-03-02 DIAGNOSIS — E78.1 HYPERTRIGLYCERIDEMIA: ICD-10-CM

## 2023-03-02 DIAGNOSIS — R73.9 ELEVATED BLOOD SUGAR: ICD-10-CM

## 2023-03-02 RX ORDER — PREGABALIN 75 MG/1
75 CAPSULE ORAL 2 TIMES DAILY
Qty: 180 CAPSULE | Refills: 1 | Status: SHIPPED | OUTPATIENT
Start: 2023-03-02

## 2023-03-02 NOTE — ASSESSMENT & PLAN NOTE
No longer on Tapazole  No longer being followed by endocrinology    TSH from February 28 was normal   We will continue to monitor

## 2023-03-02 NOTE — ASSESSMENT & PLAN NOTE
Still with various aches and pains  Overall has improved since starting hydroxychloroquine  Patient has tried a number of muscle relaxers without benefit (including methocarbamol and tizanidine)    Continue regular follow-up with rheumatologist as directed

## 2023-03-02 NOTE — PROGRESS NOTES
Name: Isaak Coffman      : 1970      MRN: [id number]  Encounter Provider: Tracy Humphries DO  Encounter Date: 3/2/2023   Encounter department: [address] [place] [address]     1  Benign essential hypertension  Assessment & Plan:  Blood pressure well controlled on lisinopril 10 mg daily      2  Graves' disease  Assessment & Plan:  No longer on Tapazole  No longer being followed by endocrinology  TSH from  was normal   We will continue to monitor      3  Hypertriglyceridemia    4  Transaminitis  Assessment & Plan:  Have normalized  We will continue to follow      5  Elevated blood sugar  Assessment & Plan:  Blood sugar from  was good at 94  A1c 5 3%  Continue reduced carb diet and exercise  We will continue to monitor      6  Undifferentiated connective tissue disease ([facility] )  Assessment & Plan:  Still with various aches and pains  Overall has improved since starting hydroxychloroquine  Patient has tried a number of muscle relaxers without benefit (including methocarbamol and tizanidine)  Continue regular follow-up with rheumatologist as directed      7  Neuropathy of right lower extremity  Assessment & Plan:  Doing well on pregabalin 75 mg twice daily  Med was refilled today    Orders:  -     pregabalin (LYRICA) 75 mg capsule; Take 1 capsule (75 mg total) by mouth 2 (two) times a day    8  H/O cold sores  Assessment & Plan:  Doing well on as needed use of Valtrex      9  Encounter for screening mammogram for malignant neoplasm of breast  -     Mammo screening bilateral w 3d & cad; Future; Expected date: 2023    10  Encounter for well woman exam with routine gynecological exam  -     Ambulatory Referral to Obstetrics / Gynecology; Future    11  Antiphospholipid antibody syndrome (HCC)    12   Cushing's syndrome ([facility] [address] )         Last colonoscopy  (next due in 5 years)  Order given for mammography    Yearly/as needed  Subjective     Patient presents for recheck of chronic medical problems today  Overall she is feeling well  Still being followed by rheumatology  Patient had labs done on February 28  Review of Systems   Respiratory: Negative  Cardiovascular: Negative  Gastrointestinal: Negative  Genitourinary: Negative          Past Medical History:   Diagnosis Date   • Acute costochondritis 04/13/2017   • Allergic rhinitis due to pollen 09/08/2016   • Antiphospholipid syndrome [facility])    • Anxiety    • Atypical chest pain 04/13/2017   • Concussion     x2   • Contact dermatitis due to adhesives 09/08/2016    Unspecified contact dermatitis type   • Dog bite, hand 03/27/2015   • Graves disease    • Hypertension    • Hypertrophy of nasal turbinates    • Hypertrophy of nasal turbinates    • Menorrhagia    • Paresthesia of left lower extremity 06/06/2016   • Pneumonia    • Recurrent cold sores 12/03/2015   • Rheumatoid arthritis ([facility] [address] )      Past Surgical History:   Procedure Laterality Date   • COLONOSCOPY     • NASAL SEPTUM SURGERY  04/01/2009    Nasal Septal Deviation Repair   • NOSE SURGERY  2010    broken nose   • GA REPAIR NASAL VESTIBULAR STENOSIS Bilateral 8/11/2022    Procedure: REPAIR VESTIBULAR STENOSIS;  Surgeon: Ghulam Dickerson MD;  [facility]: [facility];  Service: ENT   • GA RHINP PRIM LAT&ALAR CRTLGS&/ELVTN NASAL TI N/A 8/11/2022    Procedure: RHINOPLASTY;  Surgeon: Ghulam Dickerson MD;  Location: [facility];  Service: ENT   • GA SEPTOPLASTY/SUBMUCOUS RESECJ W/WO CARTILAGE GRF N/A 8/11/2022    Procedure: SEPTOPLASTY;  Surgeon: Ghulam Dickerson MD;  Location: [facility];  Service: ENT     Family History   Problem Relation Age of Onset   • Colon cancer Mother    • Alcohol abuse Father    • COPD Father    • Depression Father    • Depression Sister    • Allergic rhinitis Family    • Hypertension Family         Benign Essential Hypertension   • Coronary artery disease Family    • Diabetes Family         Diabetes Mellitus   • Colon cancer Family Social History     Socioeconomic History   • Marital status: Single     Spouse name: None   • Number of children: None   • Years of education: None   • Highest education level: None   Occupational History   • None   Tobacco Use   • Smoking status: Never   • Smokeless tobacco: Never   Vaping Use   • Vaping Use: Never used   Substance and Sexual Activity   • Alcohol use: Not Currently   • Drug use: Never   • Sexual activity: Not Currently   Other Topics Concern   • None   Social History Narrative   • None     Social Determinants of Health     Financial Resource Strain: Not on file   Food Insecurity: Not on file   Transportation Needs: Not on file   Physical Activity: Not on file   Stress: Not on file   Social Connections: Not on file   Intimate Partner Violence: Not on file   Housing Stability: Not on file     Current Outpatient Medications on File Prior to Visit   Medication Sig   • aspirin (ECOTRIN LOW STRENGTH) 81 mg EC tablet Take 81 mg by mouth daily   • Calcium Carbonate (CALCIUM 500 PO) Take 1,000 mg by mouth daily after breakfast   • cetirizine (ZyrTEC) 10 mg tablet Take 10 mg by mouth daily   • Cholecalciferol 50 MCG (2000 UT) CAPS Take 1 capsule by mouth daily after breakfast   • clobetasol (TEMOVATE) 0 05 % ointment Apply topically as needed   • FIBER PO Take by mouth daily after breakfast   • hydroxychloroquine (PLAQUENIL) 200 mg tablet Take 200 mg by mouth 2 (two) times a day with meals    • lisinopril (ZESTRIL) 10 mg tablet TAKE 1 TABLET DAILY   • Multiple Vitamins-Minerals (MULTIVITAMIN ADULT EXTRA C PO) one daily   • Multiple Vitamins-Minerals (VISION VITAMINS PO) Take by mouth   • valACYclovir (VALTREX) 1,000 mg tablet Take 1,000 mg by mouth 2 (two) times a day   • [DISCONTINUED] meclizine (ANTIVERT) 25 mg tablet 25 mg every 8 (eight) hours as needed     • [DISCONTINUED] pregabalin (LYRICA) 75 mg capsule Take 1 capsule (75 mg total) by mouth 2 (two) times a day   • diclofenac sodium (VOLTAREN) 50 mg EC tablet Take 1 tablet (50 mg total) by mouth 2 (two) times a day (Patient taking differently: Take 50 mg by mouth as needed)   • EPINEPHrine (EpiPen 2-Ralph) 0 3 mg/0 3 mL SOAJ Inject 0 3 mL (0 3 mg total) into a muscle once for 1 dose   • [DISCONTINUED] TiZANidine (Zanaflex) 4 MG capsule 1 BID PRN (Patient not taking: Reported on 3/2/2023)     Allergies   Allergen Reactions   • Albumen, Egg - Food Allergy Swelling, Eye Swelling and Facial Swelling   • Eggs Or Egg-Derived Products - Food Allergy Anaphylaxis   • Fish Allergy - Food Allergy Anaphylaxis     Patient eats no seafood of any kind due to severity of reaction   • Fish Oil - Food Allergy Swelling, Eye Swelling and Facial Swelling     Patient eats no seafood of any kind due to severity of reaction   • Shellfish-Derived Products - Food Allergy Swelling, Eye Swelling and Facial Swelling     Patient eats no seafood of any kind due to severity of reaction   • Dust Mite Extract    • Molds & Smuts    • Other      Grass   • Pollen Extract Sneezing   • Sertraline Dizziness     There is no immunization history for the selected administration types on file for this patient  Objective     /80 (BP Location: Right arm, Patient Position: Sitting, Cuff Size: Adult)   Pulse 88   Temp 97 7 °F (36 5 °C) (Temporal)   Resp 16   Ht 5' 6 93" (1 7 m)   LMP 07/21/2020 Comment: post menopausal since age 47  SpO2 96%   BMI 30 61 kg/m²     Physical Exam  Cardiovascular:      Rate and Rhythm: Normal rate and regular rhythm  Heart sounds: Normal heart sounds  Comments: Carotids: no bruits  Ext: no edema  Pulmonary:      Effort: Pulmonary effort is normal  No respiratory distress  Breath sounds: No wheezing or rales  Psychiatric:         Behavior: Behavior normal          Thought Content:  Thought content normal        Bobby Arriaza DO

## 2023-03-02 NOTE — ASSESSMENT & PLAN NOTE
Blood sugar from February 28 was good at 94  A1c 5 3%  Continue reduced carb diet and exercise    We will continue to monitor

## 2023-04-28 DIAGNOSIS — I10 BENIGN ESSENTIAL HYPERTENSION: ICD-10-CM

## 2023-04-28 RX ORDER — LISINOPRIL 10 MG/1
TABLET ORAL
Qty: 90 TABLET | Refills: 3 | Status: SHIPPED | OUTPATIENT
Start: 2023-04-28

## 2023-07-25 DIAGNOSIS — G57.91 NEUROPATHY OF RIGHT LOWER EXTREMITY: ICD-10-CM

## 2023-07-25 RX ORDER — PREGABALIN 75 MG/1
75 CAPSULE ORAL 2 TIMES DAILY
Qty: 180 CAPSULE | Refills: 0 | Status: SHIPPED | OUTPATIENT
Start: 2023-07-25

## 2023-08-03 ENCOUNTER — TELEPHONE (OUTPATIENT)
Dept: ADMINISTRATIVE | Facility: OTHER | Age: 53
End: 2023-08-03

## 2023-08-03 NOTE — TELEPHONE ENCOUNTER
----- Message from Skyler Hopper sent at 8/3/2023  8:29 AM EDT -----  Regarding: Care Gap Request  08/03/23 8:29 AM    Hello, our patient Severiano Faye has had Hepatitis C completed/performed. Please assist in updating the patient chart by pulling the Care Everywhere (CE) document. The date of service is 1/7/21.      Thank you,  Stefan Barth PG [facility]

## 2023-08-03 NOTE — TELEPHONE ENCOUNTER
Upon review of the In Basket request we were able to locate, review, and update the patient chart as requested for Hepatitis C . Any additional questions or concerns should be emailed to the Practice Liaisons via the appropriate education email address, please do not reply via In Basket.     Thank you  Celso Cramer

## 2024-04-22 DIAGNOSIS — I10 BENIGN ESSENTIAL HYPERTENSION: ICD-10-CM

## 2024-04-22 RX ORDER — LISINOPRIL 10 MG/1
TABLET ORAL
Qty: 90 TABLET | Refills: 0 | Status: SHIPPED | OUTPATIENT
Start: 2024-04-22

## 2024-04-23 DIAGNOSIS — G57.91 NEUROPATHY OF RIGHT LOWER EXTREMITY: ICD-10-CM

## 2024-04-23 DIAGNOSIS — T78.40XD ALLERGIC REACTION, SUBSEQUENT ENCOUNTER: ICD-10-CM

## 2024-04-23 NOTE — TELEPHONE ENCOUNTER
Please call patient and notify her that she is overdue for appointment.  I have given her a courtesy refill.  She needs to be seen before further refills can be authorized

## 2024-04-23 NOTE — TELEPHONE ENCOUNTER
Reason for call:   [x] Refill   [] Prior Auth  [] Other:     Office:   [x] PCP/Provider -   [] Specialty/Provider -     Medication:     Pregabalin 75 mg capsule. Take 1 capsule by mouth 2x daily #180 caps     Epinephrine 0.3 mg/0.3 mL SOAJ. Inject 0.3 mL into a muscle once for 1 dose. #0.6 mL     Pharmacy: [facility] - [address] [phone]     Does the patient have enough for 3 days?   [x] Yes   [] No - Send as HP to POD

## 2024-04-24 RX ORDER — EPINEPHRINE 0.3 MG/.3ML
0.3 INJECTION SUBCUTANEOUS ONCE
Qty: 0.6 ML | Refills: 0 | Status: SHIPPED | OUTPATIENT
Start: 2024-04-24 | End: 2024-04-24

## 2024-04-24 RX ORDER — PREGABALIN 75 MG/1
75 CAPSULE ORAL 2 TIMES DAILY
Qty: 180 CAPSULE | Refills: 0 | Status: SHIPPED | OUTPATIENT
Start: 2024-04-24

## 2024-07-22 DIAGNOSIS — I10 BENIGN ESSENTIAL HYPERTENSION: ICD-10-CM

## 2024-07-22 RX ORDER — LISINOPRIL 10 MG/1
TABLET ORAL
Qty: 90 TABLET | Refills: 1 | Status: SHIPPED | OUTPATIENT
Start: 2024-07-22

## 2024-07-24 ENCOUNTER — OFFICE VISIT (OUTPATIENT)
Dept: FAMILY MEDICINE CLINIC | Facility: CLINIC | Age: 54
End: 2024-07-24
Payer: COMMERCIAL

## 2024-07-24 VITALS
OXYGEN SATURATION: 95 % | SYSTOLIC BLOOD PRESSURE: 118 MMHG | DIASTOLIC BLOOD PRESSURE: 80 MMHG | HEART RATE: 80 BPM | TEMPERATURE: 98 F

## 2024-07-24 DIAGNOSIS — Z23 ENCOUNTER FOR IMMUNIZATION: ICD-10-CM

## 2024-07-24 DIAGNOSIS — R74.01 TRANSAMINITIS: ICD-10-CM

## 2024-07-24 DIAGNOSIS — I10 BENIGN ESSENTIAL HYPERTENSION: ICD-10-CM

## 2024-07-24 DIAGNOSIS — Z86.19 H/O COLD SORES: ICD-10-CM

## 2024-07-24 DIAGNOSIS — Z00.00 WELL ADULT EXAM: Primary | ICD-10-CM

## 2024-07-24 DIAGNOSIS — R73.9 ELEVATED BLOOD SUGAR: ICD-10-CM

## 2024-07-24 DIAGNOSIS — Z12.31 ENCOUNTER FOR SCREENING MAMMOGRAM FOR MALIGNANT NEOPLASM OF BREAST: ICD-10-CM

## 2024-07-24 DIAGNOSIS — E05.00 GRAVES' DISEASE: ICD-10-CM

## 2024-07-24 DIAGNOSIS — E24.9 CUSHING'S SYNDROME (HCC): ICD-10-CM

## 2024-07-24 DIAGNOSIS — M35.9 UNDIFFERENTIATED CONNECTIVE TISSUE DISEASE (HCC): ICD-10-CM

## 2024-07-24 DIAGNOSIS — E78.1 HYPERTRIGLYCERIDEMIA: ICD-10-CM

## 2024-07-24 DIAGNOSIS — T78.40XD ALLERGIC REACTION, SUBSEQUENT ENCOUNTER: ICD-10-CM

## 2024-07-24 DIAGNOSIS — Z12.11 SCREEN FOR COLON CANCER: ICD-10-CM

## 2024-07-24 DIAGNOSIS — G57.91 NEUROPATHY OF RIGHT LOWER EXTREMITY: ICD-10-CM

## 2024-07-24 DIAGNOSIS — Z11.59 NEED FOR HEPATITIS C SCREENING TEST: ICD-10-CM

## 2024-07-24 PROBLEM — D68.61 ANTIPHOSPHOLIPID ANTIBODY SYNDROME (HCC): Status: RESOLVED | Noted: 2023-03-02 | Resolved: 2024-07-24

## 2024-07-24 PROCEDURE — 99396 PREV VISIT EST AGE 40-64: CPT | Performed by: FAMILY MEDICINE

## 2024-07-24 RX ORDER — VALACYCLOVIR HYDROCHLORIDE 1 G/1
1000 TABLET, FILM COATED ORAL 2 TIMES DAILY
Qty: 48 TABLET | Refills: 2 | Status: SHIPPED | OUTPATIENT
Start: 2024-07-24 | End: 2024-10-04

## 2024-07-24 RX ORDER — EPINEPHRINE 0.3 MG/.3ML
0.3 INJECTION SUBCUTANEOUS ONCE
Qty: 0.6 ML | Refills: 2 | Status: SHIPPED | OUTPATIENT
Start: 2024-07-24 | End: 2024-07-25 | Stop reason: SDUPTHER

## 2024-07-24 NOTE — PROGRESS NOTES
Ambulatory Visit  Name: Vivian Curiel      : 1970      MRN: [id number]  Encounter Provider: Hadley Montero DO  Encounter Date: 2024   Encounter department: [facility]    Assessment & Plan   1. Well adult exam  2. Benign essential hypertension  Assessment & Plan:  Blood pressure well-controlled on lisinopril 10 mg daily  Orders:  -     CBC and differential; Future  -     CBC and differential  3. Elevated blood sugar  Assessment & Plan:  History of mildly elevated blood sugar.  Patient is due for repeat labs in near future.  Will call with results  Orders:  -     Comprehensive metabolic panel; Future  -     Hemoglobin A1C; Future  -     Comprehensive metabolic panel  -     Hemoglobin A1C  4. Graves' disease  Assessment & Plan:  History of Graves' disease.  No longer on Tapazole.  No longer being followed by endocrinologist.  Patient lately has been feeling that her thyroid is been off.  Will recheck thyroid labs.  Will also most likely refer to new endocrinologist  Orders:  -     TSH, 3rd generation with Free T4 reflex; Future  -     T3; Future  -     TSH, 3rd generation with Free T4 reflex  5. Hypertriglyceridemia  Assessment & Plan:  Continue low-fat diet and exercise.  Recheck labs in near future  Orders:  -     Lipid Panel with Direct LDL reflex; Future  -     Lipid Panel with Direct LDL reflex  6. Transaminitis  Assessment & Plan:  Have normalized.  She is due for repeat labs.  Will call with results  Orders:  -     Comprehensive metabolic panel; Future  -     Comprehensive metabolic panel  7. Undifferentiated connective tissue disease (HCC)  Assessment & Plan:  History of undifferentiated connective tissue disease.  Still with various aches and pains.  Still taking hydroxychloroquine.  She states her rheumatologist has left the practice and she is looking for a new referral.  Order placed today  Orders:  -     Ambulatory Referral to Rheumatology; Future  8. Neuropathy of right  lower extremity  Assessment & Plan:  No longer on pregabalin.  Stable  9. H/O cold sores  Assessment & Plan:  Refilled Valtrex  Orders:  -     valACYclovir (VALTREX) 1,000 mg tablet; Take 1 tablet (1,000 mg total) by mouth 2 (two) times a day  10. Need for hepatitis C screening test  -     Hepatitis C antibody; Future  -     Hepatitis C antibody  11. Cushing's syndrome (HCC)  Assessment & Plan:  History of Cushing's disease in remission.  Patient has been having symptoms lately that make her think her Cushing's is coming back (abdominal bloating, thyroid feels off).  Will check thyroid labs and cortisol level.  Will most likely refer to new endocrinologist as well  Orders:  -     Cortisol Level, AM Specimen; Future  12. Allergic reaction, subsequent encounter  13. Encounter for immunization  -     TDAP VACCINE GREATER THAN OR EQUAL TO 6YO IM  14. Screen for colon cancer  -     Ambulatory Referral to Gastroenterology; Future  15. Encounter for screening mammogram for malignant neoplasm of breast  -     Mammo screening bilateral w 3d & cad; Future         Patient presents for 54-year-old physical examination today.  She recently lost her job, otherwise she is concerned that her Cushing's disease and/or thyroid may be abnormal again.  No longer being followed by endocrinologist.  She also needs to see a new rheumatologist because her previous 1 has left the practice.  She is a non-smoker.  She only drinks alcohol on rare occasions.  Exam today essentially unremarkable.  Recommend continue to work on healthy diet and regular exercise program.    Last colonoscopy 2019.  She is due this year for repeat.  Order placed    Due/overdue for mammogram.  New order placed    Adacel booster given today  Patient declines Shingrix at this time    Orders for fasting blood work placed.  Will call with results    Yearly/as needed      History of Present Illness     Patient presents for 54-year-old physical examination today.  She  recently lost her job, otherwise she is concerned that her Cushing's disease and/or thyroid may be abnormal again.  No longer being followed by endocrinologist.  She also needs to see a new rheumatologist because her previous 1 has left the practice.  She is a non-smoker.  She only drinks alcohol on rare occasions.      Review of Systems   Constitutional:  Negative for chills and fever.   HENT:  Negative for ear pain and sore throat.    Eyes:  Negative for pain and visual disturbance.   Respiratory:  Negative for cough and shortness of breath.    Cardiovascular:  Negative for chest pain and palpitations.   Gastrointestinal:  Negative for abdominal pain and vomiting.   Genitourinary:  Negative for dysuria and hematuria.   Musculoskeletal:  Negative for arthralgias and back pain.   Skin:  Negative for color change and rash.   Neurological:  Negative for seizures and syncope.   All other systems reviewed and are negative.    Past Medical History:   Diagnosis Date   • Acute costochondritis 04/13/2017   • Allergic    • Allergic rhinitis due to pollen 09/08/2016   • Antiphospholipid syndrome (HCC)    • Anxiety    • Atypical chest pain 04/13/2017   • Concussion     x2   • Contact dermatitis due to adhesives 09/08/2016    Unspecified contact dermatitis type   • Dog bite, hand 03/27/2015   • Graves disease    • Hypertension    • Hypertrophy of nasal turbinates    • Hypertrophy of nasal turbinates    • Menorrhagia    • Paresthesia of left lower extremity 06/06/2016   • Pneumonia    • Recurrent cold sores 12/03/2015   • Rheumatoid arthritis (HCC)      Past Surgical History:   Procedure Laterality Date   • COLONOSCOPY     • NASAL SEPTUM SURGERY  04/01/2009    Nasal Septal Deviation Repair   • NOSE SURGERY  2010    broken nose   • [place] REPAIR NASAL VESTIBULAR STENOSIS Bilateral 8/11/2022    Procedure: REPAIR VESTIBULAR STENOSIS;  Surgeon: Didier Blake MD;  [facility]: [facility];  Service: ENT   • MA RHINP PRIM LAT&ALAR  CRTLGS&/ELVTN NASAL TI N/A 8/11/2022    Procedure: RHINOPLASTY;  Surgeon: Didier Blake MD;  [facility];  Service: ENT   • [place] SEPTOPLASTY/SUBMUCOUS RESECJ W/WO CARTILAGE GRF N/A 8/11/2022    Procedure: SEPTOPLASTY;  Surgeon: Didier Blake MD;  Location: [facility];  Service: ENT     Family History   Problem Relation Age of Onset   • Colon cancer Mother    • Hypertension Mother    • Heart disease Mother    • Diabetes Mother         Type 2   • Thyroid disease Mother    • Cancer Mother         Colon Cancer   • Hearing loss Mother    • Alcohol abuse Father    • COPD Father    • Depression Father    • Depression Sister    • Hypertension Sister    • Thyroid disease Sister    • Allergic rhinitis Family    • Hypertension Family         Benign Essential Hypertension   • Coronary artery disease Family    • Diabetes Family         Diabetes Mellitus   • Colon cancer Family    • Colon cancer Maternal Grandmother    • Cancer Maternal Grandmother    • Arthritis Paternal Grandmother    • Alcohol abuse Sister    • Depression Sister    • Thyroid disease Sister    • Asthma Sister    • Hearing loss Maternal Uncle    • Hearing loss Maternal Uncle    • Hearing loss Maternal Uncle      Social History     Tobacco Use   • Smoking status: Never   • Smokeless tobacco: Never   Vaping Use   • Vaping status: Never Used   Substance and Sexual Activity   • Alcohol use: Not Currently   • Drug use: No   • Sexual activity: Not Currently     Partners: Male     Birth control/protection: Other     Comment: In menopause     Current Outpatient Medications on File Prior to Visit   Medication Sig   • aspirin (ECOTRIN LOW STRENGTH) 81 mg EC tablet Take 81 mg by mouth daily   • Calcium Carbonate (CALCIUM 500 PO) Take 1,000 mg by mouth daily after breakfast   • cetirizine (ZyrTEC) 10 mg tablet Take 10 mg by mouth daily   • Cholecalciferol 50 MCG (2000 UT) CAPS Take 1 capsule by mouth daily after breakfast   • clobetasol (TEMOVATE) 0.05 %  ointment Apply topically as needed   • FIBER PO Take by mouth daily after breakfast   • hydroxychloroquine (PLAQUENIL) 200 mg tablet Take 200 mg by mouth 2 (two) times a day with meals    • lisinopril (ZESTRIL) 10 mg tablet TAKE 1 TABLET DAILY   • Multiple Vitamins-Minerals (MULTIVITAMIN ADULT EXTRA C PO) one daily   • Multiple Vitamins-Minerals (VISION VITAMINS PO) Take by mouth   • pregabalin (LYRICA) 75 mg capsule Take 1 capsule (75 mg total) by mouth 2 (two) times a day   • diclofenac sodium (VOLTAREN) 50 mg EC tablet Take 1 tablet (50 mg total) by mouth 2 (two) times a day (Patient taking differently: Take 50 mg by mouth as needed)     Allergies   Allergen Reactions   • Albumen, Egg - Food Allergy Swelling, Eye Swelling and Facial Swelling   • Eggs Or Egg-Derived Products - Food Allergy Anaphylaxis   • Fish Allergy - Food Allergy Anaphylaxis     Patient eats no seafood of any kind due to severity of reaction   • Fish Oil - Food Allergy Swelling, Eye Swelling and Facial Swelling     Patient eats no seafood of any kind due to severity of reaction   • Shellfish-Derived Products - Food Allergy Swelling, Eye Swelling and Facial Swelling     Patient eats no seafood of any kind due to severity of reaction   • Dust Mite Extract    • Molds & Smuts    • Other      Grass   • Pollen Extract Sneezing   • Sertraline Dizziness     Immunization History   Administered Date(s) Administered   • Tdap 08/07/2024     Objective     /80 (BP Location: Right arm, Patient Position: Sitting, Cuff Size: Standard)   Pulse 80   Temp 98 °F (36.7 °C) (Temporal)   LMP 07/21/2020 Comment: post menopausal since age 49  SpO2 95%     Physical Exam  Vitals and nursing note reviewed.   Constitutional:       Appearance: She is well-developed.   HENT:      Head: Normocephalic and atraumatic.      Right Ear: External ear normal.      Left Ear: External ear normal.   Eyes:      Pupils: Pupils are equal, round, and reactive to light.    Cardiovascular:      Rate and Rhythm: Normal rate and regular rhythm.      Heart sounds: Normal heart sounds.   Pulmonary:      Effort: Pulmonary effort is normal.      Breath sounds: Normal breath sounds.   Abdominal:      General: Bowel sounds are normal.      Palpations: Abdomen is soft.   Musculoskeletal:      Cervical back: Normal range of motion and neck supple.   Neurological:      Mental Status: She is alert and oriented to person, place, and time.      Deep Tendon Reflexes: Reflexes are normal and symmetric.   Psychiatric:         Behavior: Behavior normal.         Thought Content: Thought content normal.         Judgment: Judgment normal.

## 2024-07-24 NOTE — ASSESSMENT & PLAN NOTE
History of mildly elevated blood sugar.  Patient is due for repeat labs in near future.  Will call with results

## 2024-07-24 NOTE — ASSESSMENT & PLAN NOTE
History of undifferentiated connective tissue disease.  Still with various aches and pains.  Still taking hydroxychloroquine.  She states her rheumatologist has left the practice and she is looking for a new referral.  Order placed today

## 2024-07-24 NOTE — ASSESSMENT & PLAN NOTE
History of Graves' disease.  No longer on Tapazole.  No longer being followed by endocrinologist.  Patient lately has been feeling that her thyroid is been off.  Will recheck thyroid labs.  Will also most likely refer to new endocrinologist

## 2024-07-24 NOTE — ASSESSMENT & PLAN NOTE
History of Cushing's disease in remission.  Patient has been having symptoms lately that make her think her Cushing's is coming back (abdominal bloating, thyroid feels off).  Will check thyroid labs and cortisol level.  Will most likely refer to new endocrinologist as well

## 2024-07-25 ENCOUNTER — TELEPHONE (OUTPATIENT)
Dept: FAMILY MEDICINE CLINIC | Facility: CLINIC | Age: 54
End: 2024-07-25

## 2024-07-25 DIAGNOSIS — T78.40XD ALLERGIC REACTION, SUBSEQUENT ENCOUNTER: ICD-10-CM

## 2024-07-25 RX ORDER — EPINEPHRINE 0.3 MG/.3ML
0.3 INJECTION INTRAMUSCULAR ONCE
Qty: 0.6 ML | Refills: 2 | Status: SHIPPED | OUTPATIENT
Start: 2024-07-25 | End: 2024-07-25

## 2024-07-25 RX ORDER — EPINEPHRINE 0.3 MG/.3ML
0.3 INJECTION INTRAMUSCULAR ONCE
Qty: 0.3 ML | Refills: 2 | Status: CANCELLED | OUTPATIENT
Start: 2024-07-25 | End: 2024-07-25

## 2024-07-29 LAB
ALBUMIN SERPL-MCNC: 4.3 G/DL (ref 3.5–5.7)
ALP SERPL-CCNC: 66 U/L (ref 35–120)
ALT SERPL-CCNC: 19 U/L
ANION GAP SERPL CALCULATED.3IONS-SCNC: 11 MMOL/L (ref 3–11)
AST SERPL-CCNC: 18 U/L
BASOPHILS # BLD AUTO: 0 THOU/CMM (ref 0–0.1)
BASOPHILS NFR BLD AUTO: 1 %
BILIRUB SERPL-MCNC: 0.5 MG/DL (ref 0.2–1)
BUN SERPL-MCNC: 17 MG/DL (ref 7–25)
CALCIUM SERPL-MCNC: 9.2 MG/DL (ref 8.5–10.1)
CHLORIDE SERPL-SCNC: 104 MMOL/L (ref 100–109)
CHOLEST SERPL-MCNC: 199 MG/DL
CHOLEST/HDLC SERPL: 3.6 {RATIO}
CO2 SERPL-SCNC: 28 MMOL/L (ref 21–31)
CREAT SERPL-MCNC: 0.72 MG/DL (ref 0.4–1.1)
CYTOLOGY CMNT CVX/VAG CYTO-IMP: NORMAL
DIFFERENTIAL METHOD BLD: ABNORMAL
EOSINOPHIL # BLD AUTO: 0.4 THOU/CMM (ref 0–0.5)
EOSINOPHIL NFR BLD AUTO: 8 %
ERYTHROCYTE [DISTWIDTH] IN BLOOD BY AUTOMATED COUNT: 12.8 % (ref 12–16)
EST. AVERAGE GLUCOSE BLD GHB EST-MCNC: 103 MG/DL
GFR/BSA.PRED SERPLBLD CYS-BASED-ARV: 98 ML/MIN/{1.73_M2}
GLUCOSE SERPL-MCNC: 89 MG/DL (ref 65–99)
HBA1C MFR BLD: 5.2 %
HCT VFR BLD AUTO: 42.3 % (ref 35–43)
HCV AB SERPL QL IA: NONREACTIVE
HDLC SERPL-MCNC: 55 MG/DL (ref 23–92)
HGB BLD-MCNC: 14.6 G/DL (ref 11.5–14.5)
LDLC SERPL CALC-MCNC: 102 MG/DL
LYMPHOCYTES # BLD AUTO: 1.7 THOU/CMM (ref 1–3)
LYMPHOCYTES NFR BLD AUTO: 35 %
MCH RBC QN AUTO: 31.4 PG (ref 26–34)
MCHC RBC AUTO-ENTMCNC: 34.4 G/DL (ref 32–37)
MCV RBC AUTO: 91 FL (ref 80–100)
MONOCYTES # BLD AUTO: 0.4 THOU/CMM (ref 0.3–1)
MONOCYTES NFR BLD AUTO: 9 %
NEUTROPHILS # BLD AUTO: 2.3 THOU/CMM (ref 1.8–7.8)
NEUTROPHILS NFR BLD AUTO: 47 %
NONHDLC SERPL-MCNC: 144 MG/DL
PLATELET # BLD AUTO: 280 THOU/CMM (ref 140–350)
PMV BLD REES-ECKER: 7.9 FL (ref 7.5–11.3)
POTASSIUM SERPL-SCNC: 4.6 MMOL/L (ref 3.5–5.2)
PROT SERPL-MCNC: 6.6 G/DL (ref 6.3–8.3)
RBC # BLD AUTO: 4.64 MILL/CMM (ref 3.7–4.7)
SODIUM SERPL-SCNC: 143 MMOL/L (ref 135–145)
TRIGL SERPL-MCNC: 210 MG/DL
TSH SERPL-ACNC: 3.07 UIU/ML (ref 0.45–5.33)
WBC # BLD AUTO: 4.8 THOU/CMM (ref 4–10)

## 2024-08-07 PROCEDURE — 90471 IMMUNIZATION ADMIN: CPT

## 2024-08-07 PROCEDURE — 90715 TDAP VACCINE 7 YRS/> IM: CPT

## 2024-08-14 ENCOUNTER — PREP FOR PROCEDURE (OUTPATIENT)
Age: 54
End: 2024-08-14

## 2024-08-14 ENCOUNTER — TELEPHONE (OUTPATIENT)
Age: 54
End: 2024-08-14

## 2024-08-14 DIAGNOSIS — Z80.0 FAMILY HISTORY OF COLON CANCER: Primary | ICD-10-CM

## 2024-08-14 NOTE — TELEPHONE ENCOUNTER
Scheduled date of colonoscopy (as of today): 9/25/24  Physician performing colonoscopy:   Location of colonoscopy: [facility]  Bowel prep reviewed with patient: Surekha Del Toro  Instructions reviewed with patient by: Surekha Del Toro  Clearances:N/A       Miralax Dulcolax prep sent via [employer]

## 2024-08-14 NOTE — TELEPHONE ENCOUNTER
08/14/24  Screened by: Surekha Del Toro    Referring Provider     Pre- Screening:     There is no height or weight on file to calculate BMI.  Has patient been referred for a routine screening Colonoscopy? yes  Is the patient between 45-75 years old? yes      Previous Colonoscopy yes   If yes:    Date: 2019    Facility: [facility]    Reason: Screening          Does the patient want to see a Gastroenterologist prior to their procedure OR are they having any GI symptoms? no    Has the patient been hospitalized or had abdominal surgery in the past 6 months? no    Does the patient use supplemental oxygen? no    Does the patient take Coumadin, Lovenox, Plavix, Elliquis, Xarelto, or other blood thinning medication? no    Has the patient had a stroke, cardiac event, or stent placed in the past year? no        If patient is between 45yrs - 49yrs, please advise patient that we will have to confirm benefits & coverage with their insurance company for a routine screening colonoscopy.

## 2024-08-26 ENCOUNTER — TELEPHONE (OUTPATIENT)
Dept: GASTROENTEROLOGY | Facility: MEDICAL CENTER | Age: 54
End: 2024-08-26

## 2024-08-26 NOTE — TELEPHONE ENCOUNTER
Procedure Confirmation sent via [employer] for patient to confirm, attached the prep instructions to the message for patient to review

## 2024-09-11 ENCOUNTER — ANESTHESIA (OUTPATIENT)
Dept: ANESTHESIOLOGY | Facility: HOSPITAL | Age: 54
End: 2024-09-11

## 2024-09-11 ENCOUNTER — ANESTHESIA EVENT (OUTPATIENT)
Dept: ANESTHESIOLOGY | Facility: HOSPITAL | Age: 54
End: 2024-09-11

## 2024-09-11 DIAGNOSIS — G57.91 NEUROPATHY OF RIGHT LOWER EXTREMITY: ICD-10-CM

## 2024-09-11 RX ORDER — PREGABALIN 75 MG/1
75 CAPSULE ORAL 2 TIMES DAILY
Qty: 180 CAPSULE | Refills: 1 | Status: SHIPPED | OUTPATIENT
Start: 2024-09-11

## 2024-09-11 NOTE — TELEPHONE ENCOUNTER
Reason for call:   [x] Refill   [] Prior Auth  [] Other:     Office:   [x] PCP/Provider - Dr. Montero  [] Specialty/Provider -     Medication: pregabalin    Dose/Frequency: 75 mg bid     Quantity: 90D     Pharmacy: [facility]     Does the patient have enough for 3 days?   [x] Yes   [] No - Send as HP to POD

## 2024-09-23 ENCOUNTER — TELEPHONE (OUTPATIENT)
Dept: GASTROENTEROLOGY | Facility: MEDICAL CENTER | Age: 54
End: 2024-09-23

## 2024-09-23 NOTE — TELEPHONE ENCOUNTER
Left voicemail and requested call back     Called patient to confirm procedure for 09/25 please give us a call to confirm, can also confirm via [employer]

## 2024-09-25 ENCOUNTER — ANESTHESIA (OUTPATIENT)
Dept: GASTROENTEROLOGY | Facility: MEDICAL CENTER | Age: 54
End: 2024-09-25
Payer: COMMERCIAL

## 2024-09-25 ENCOUNTER — ANESTHESIA EVENT (OUTPATIENT)
Dept: GASTROENTEROLOGY | Facility: MEDICAL CENTER | Age: 54
End: 2024-09-25
Payer: COMMERCIAL

## 2024-09-25 ENCOUNTER — HOSPITAL ENCOUNTER (OUTPATIENT)
Dept: GASTROENTEROLOGY | Facility: MEDICAL CENTER | Age: 54
Setting detail: OUTPATIENT SURGERY
Discharge: HOME | End: 2024-09-25
Attending: INTERNAL MEDICINE
Payer: COMMERCIAL

## 2024-09-25 VITALS
DIASTOLIC BLOOD PRESSURE: 76 MMHG | TEMPERATURE: 97.7 F | HEIGHT: 66 IN | BODY MASS INDEX: 32.62 KG/M2 | HEART RATE: 75 BPM | SYSTOLIC BLOOD PRESSURE: 115 MMHG | OXYGEN SATURATION: 97 % | WEIGHT: 203 LBS | RESPIRATION RATE: 18 BRPM

## 2024-09-25 DIAGNOSIS — Z80.0 FAMILY HISTORY OF COLON CANCER: ICD-10-CM

## 2024-09-25 PROCEDURE — 45385 COLONOSCOPY W/LESION REMOVAL: CPT | Performed by: INTERNAL MEDICINE

## 2024-09-25 PROCEDURE — 88305 TISSUE EXAM BY PATHOLOGIST: CPT | Performed by: PATHOLOGY

## 2024-09-25 RX ORDER — LIDOCAINE HYDROCHLORIDE 20 MG/ML
INJECTION, SOLUTION EPIDURAL; INFILTRATION; INTRACAUDAL; PERINEURAL AS NEEDED
Status: DISCONTINUED | OUTPATIENT
Start: 2024-09-25 | End: 2024-09-25

## 2024-09-25 RX ORDER — SODIUM CHLORIDE 9 MG/ML
125 INJECTION, SOLUTION INTRAVENOUS CONTINUOUS
Status: DISCONTINUED | OUTPATIENT
Start: 2024-09-25 | End: 2024-09-29 | Stop reason: HOSPADM

## 2024-09-25 RX ORDER — PROPOFOL 10 MG/ML
INJECTION, EMULSION INTRAVENOUS AS NEEDED
Status: DISCONTINUED | OUTPATIENT
Start: 2024-09-25 | End: 2024-09-25

## 2024-09-25 RX ORDER — SODIUM CHLORIDE 9 MG/ML
125 INJECTION, SOLUTION INTRAVENOUS CONTINUOUS
Status: CANCELLED | OUTPATIENT
Start: 2024-09-25

## 2024-09-25 RX ADMIN — PROPOFOL 120 MCG/KG/MIN: 10 INJECTION, EMULSION INTRAVENOUS at 14:13

## 2024-09-25 RX ADMIN — PROPOFOL 40 MG: 10 INJECTION, EMULSION INTRAVENOUS at 14:15

## 2024-09-25 RX ADMIN — LIDOCAINE HYDROCHLORIDE 100 MG: 20 INJECTION, SOLUTION EPIDURAL; INFILTRATION; INTRACAUDAL at 14:12

## 2024-09-25 RX ADMIN — SODIUM CHLORIDE 125 ML/HR: 0.9 INJECTION, SOLUTION INTRAVENOUS at 14:02

## 2024-09-25 RX ADMIN — PROPOFOL 100 MG: 10 INJECTION, EMULSION INTRAVENOUS at 14:12

## 2024-09-25 NOTE — H&P
History and Physical - [facility]  Vivian Curiel 54 y.o. female MRN: [id number]                  HPI: Vivian Curiel is a 54 y.o. year old female who presents for colon cancer screening      REVIEW OF SYSTEMS: Per the HPI, and otherwise unremarkable.    Historical Information   Past Medical History:   Diagnosis Date    Acute costochondritis 04/13/2017    Allergic     Allergic rhinitis due to pollen 09/08/2016    Antiphospholipid syndrome (HCC)     Anxiety     Atypical chest pain 04/13/2017    Concussion     x2    Contact dermatitis due to adhesives 09/08/2016    Unspecified contact dermatitis type    Dog bite, hand 03/27/2015    Graves disease     Hypertension     Hypertrophy of nasal turbinates     Hypertrophy of nasal turbinates     Menorrhagia     Paresthesia of left lower extremity 06/06/2016    Pneumonia     Recurrent cold sores 12/03/2015    Rheumatoid arthritis (HCC)      Past Surgical History:   Procedure Laterality Date    COLONOSCOPY      NASAL SEPTUM SURGERY  04/01/2009    Nasal Septal Deviation Repair    NOSE SURGERY  2010    broken nose    [place] REPAIR NASAL VESTIBULAR STENOSIS Bilateral 8/11/2022    Procedure: REPAIR VESTIBULAR STENOSIS;  Surgeon: Didier Blake MD;  [facility];  Service: ENT    [place] RHINP PRIM LAT&ALAR CRTLGS&/ELVTN NASAL TI N/A 8/11/2022    Procedure: RHINOPLASTY;  Surgeon: Didier Blake MD;  [facility];  Service: ENT    [place] SEPTOPLASTY/SUBMUCOUS RESECJ W/WO CARTILAGE GRF N/A 8/11/2022    Procedure: SEPTOPLASTY;  Surgeon: Didier Blake MD;  [facility];  Service: ENT     Social History   Social History     Substance and Sexual Activity   Alcohol Use Not Currently     Social History     Substance and Sexual Activity   Drug Use No     Social History     Tobacco Use   Smoking Status Never   Smokeless Tobacco Never     Family History   Problem Relation Age of Onset    Colon cancer Mother     Hypertension Mother     Heart disease  Mother     Diabetes Mother         Type 2    Thyroid disease Mother     Cancer Mother         Colon Cancer    Hearing loss Mother     Alcohol abuse Father     COPD Father     Depression Father     Depression Sister     Hypertension Sister     Thyroid disease Sister     Allergic rhinitis Family     Hypertension Family         Benign Essential Hypertension    Coronary artery disease Family     Diabetes Family         Diabetes Mellitus    Colon cancer Family     Colon cancer Maternal Grandmother     Cancer Maternal Grandmother     Arthritis Paternal Grandmother     Alcohol abuse Sister     Depression Sister     Thyroid disease Sister     Asthma Sister     Hearing loss Maternal Uncle     Hearing loss Maternal Uncle     Hearing loss Maternal Uncle        Meds/Allergies       Current Outpatient Medications:     Calcium Carbonate (CALCIUM 500 PO)    cetirizine (ZyrTEC) 10 mg tablet    Cholecalciferol 50 MCG (2000 UT) CAPS    hydroxychloroquine (PLAQUENIL) 200 mg tablet    lisinopril (ZESTRIL) 10 mg tablet    Multiple Vitamins-Minerals (MULTIVITAMIN ADULT EXTRA C PO)    Multiple Vitamins-Minerals (VISION VITAMINS PO)    pregabalin (LYRICA) 75 mg capsule    aspirin (ECOTRIN LOW STRENGTH) 81 mg EC tablet    clobetasol (TEMOVATE) 0.05 % ointment    diclofenac sodium (VOLTAREN) 50 mg EC tablet    EpiPen 2-Ralph 0.3 MG/0.3ML SOAJ    FIBER PO    valACYclovir (VALTREX) 1,000 mg tablet    Current Facility-Administered Medications:     sodium chloride 0.9 % infusion, 125 mL/hr, Intravenous, Continuous    Allergies   Allergen Reactions    Albumen, Egg - Food Allergy Swelling, Eye Swelling and Facial Swelling    Eggs Or Egg-Derived Products - Food Allergy Anaphylaxis    Fish Allergy - Food Allergy Anaphylaxis     Patient eats no seafood of any kind due to severity of reaction    Fish Oil - Food Allergy Swelling, Eye Swelling and Facial Swelling     Patient eats no seafood of any kind due to severity of reaction    Shellfish-Derived  "Products - Food Allergy Swelling, Eye Swelling and Facial Swelling     Patient eats no seafood of any kind due to severity of reaction    Dust Mite Extract     Molds & Smuts     Other      Grass    Pollen Extract Sneezing    Sertraline Dizziness       Objective     /61   Pulse 74   Temp 97.7 °F (36.5 °C) (Temporal)   Resp 15   Ht 5' 6\" (1.676 m)   Wt 92.1 kg (203 lb)   LMP 07/21/2020 Comment: post menopausal since age 49  SpO2 96%   BMI 32.77 kg/m²       PHYSICAL EXAM    Gen: NAD  Head: NCAT  CV: RRR  CHEST: Clear  ABD: soft, NT/ND  EXT: no edema      ASSESSMENT/PLAN:  This is a 54 y.o. year old female here for colon cancer screening and she is stable and optimized for her procedure.       "

## 2024-09-25 NOTE — ANESTHESIA PREPROCEDURE EVALUATION
Procedure:  COLONOSCOPY    Relevant Problems   ANESTHESIA (within normal limits)  No adverse reactions to prior propofol despite stated egg allergy      CARDIO   (+) Benign essential hypertension   (+) Hypertriglyceridemia      ENDO   (+) Hyperthyroidism      MUSCULOSKELETAL   (+) Bilateral low back pain with right-sided sciatica   (+) Osteoarthritis of hip      NEURO/PSYCH   (+) Anxiety   (+) Depression      PULMONARY   (+) Mild intermittent asthma without complication      Endocrine   (+) Cushing's syndrome (HCC)        Physical Exam    Airway    Mallampati score: II  TM Distance: >3 FB  Neck ROM: full     Dental   No notable dental hx     Cardiovascular  Cardiovascular exam normal    Pulmonary  Pulmonary exam normal     Other Findings  post-pubertal.      Anesthesia Plan  ASA Score- 2     Anesthesia Type- IV sedation with anesthesia with ASA Monitors.         Additional Monitors:     Airway Plan:            Plan Factors-    Chart reviewed.    Patient summary reviewed.                  Induction- intravenous.    Postoperative Plan-         Informed Consent- Anesthetic plan and risks discussed with patient.

## 2024-09-27 PROCEDURE — 88305 TISSUE EXAM BY PATHOLOGIST: CPT | Performed by: PATHOLOGY

## 2024-09-28 NOTE — RESULT ENCOUNTER NOTE
Polyp removed was a benign polyp, it showed no feature of precancerous polyp, can repeat colonoscopy in 5 years instead of 3 years.

## 2024-10-01 ENCOUNTER — TELEPHONE (OUTPATIENT)
Dept: GASTROENTEROLOGY | Facility: MEDICAL CENTER | Age: 54
End: 2024-10-01

## 2024-10-01 NOTE — TELEPHONE ENCOUNTER
Called left  for non urgent colonoscopy results she can also look her results from my chart any questions or concerns she  can contact the office     ----- Message from Chad Marquez MD sent at 9/28/2024 11:36 AM EDT -----  Polyp removed was a benign polyp, it showed no feature of precancerous polyp, can repeat colonoscopy in 5 years instead of 3 years.

## 2025-03-21 DIAGNOSIS — I10 BENIGN ESSENTIAL HYPERTENSION: ICD-10-CM

## 2025-03-21 DIAGNOSIS — Z86.19 H/O COLD SORES: ICD-10-CM

## 2025-03-21 DIAGNOSIS — G57.91 NEUROPATHY OF RIGHT LOWER EXTREMITY: ICD-10-CM

## 2025-03-21 RX ORDER — VALACYCLOVIR HYDROCHLORIDE 1 G/1
1000 TABLET, FILM COATED ORAL 2 TIMES DAILY
Qty: 48 TABLET | Refills: 0 | Status: SHIPPED | OUTPATIENT
Start: 2025-03-21 | End: 2025-06-01

## 2025-03-21 RX ORDER — LISINOPRIL 10 MG/1
10 TABLET ORAL DAILY
Qty: 90 TABLET | Refills: 1 | Status: SHIPPED | OUTPATIENT
Start: 2025-03-21

## 2025-03-21 NOTE — TELEPHONE ENCOUNTER
Patient called in stating that Lyrica 75 mg was sent to [facility]. Patient is requesting  not to send it to [facility].  Patient is requesting it to be send to [facility] pharmacy [id number].

## 2025-03-23 RX ORDER — PREGABALIN 75 MG/1
75 CAPSULE ORAL 2 TIMES DAILY
Qty: 180 CAPSULE | Refills: 1 | Status: SHIPPED | OUTPATIENT
Start: 2025-03-23

## 2025-07-28 ENCOUNTER — OFFICE VISIT (OUTPATIENT)
Dept: FAMILY MEDICINE CLINIC | Facility: CLINIC | Age: 55
End: 2025-07-28
Payer: COMMERCIAL

## 2025-07-28 VITALS
HEIGHT: 66 IN | DIASTOLIC BLOOD PRESSURE: 86 MMHG | WEIGHT: 216.8 LBS | BODY MASS INDEX: 34.84 KG/M2 | HEART RATE: 87 BPM | SYSTOLIC BLOOD PRESSURE: 138 MMHG | OXYGEN SATURATION: 97 % | TEMPERATURE: 98.2 F

## 2025-07-28 DIAGNOSIS — R73.9 ELEVATED BLOOD SUGAR: ICD-10-CM

## 2025-07-28 DIAGNOSIS — F32.A DEPRESSION, UNSPECIFIED DEPRESSION TYPE: ICD-10-CM

## 2025-07-28 DIAGNOSIS — J45.20 MILD INTERMITTENT ASTHMA WITHOUT COMPLICATION: ICD-10-CM

## 2025-07-28 DIAGNOSIS — E78.1 HYPERTRIGLYCERIDEMIA: ICD-10-CM

## 2025-07-28 DIAGNOSIS — M35.9 UNDIFFERENTIATED CONNECTIVE TISSUE DISEASE (HCC): ICD-10-CM

## 2025-07-28 DIAGNOSIS — R74.01 TRANSAMINITIS: ICD-10-CM

## 2025-07-28 DIAGNOSIS — K51.40 PSEUDOPOLYPOSIS OF COLON WITHOUT COMPLICATION, UNSPECIFIED PART OF COLON (HCC): ICD-10-CM

## 2025-07-28 DIAGNOSIS — I10 BENIGN ESSENTIAL HYPERTENSION: ICD-10-CM

## 2025-07-28 DIAGNOSIS — Z86.19 H/O COLD SORES: ICD-10-CM

## 2025-07-28 DIAGNOSIS — G57.91 NEUROPATHY OF RIGHT LOWER EXTREMITY: ICD-10-CM

## 2025-07-28 DIAGNOSIS — Z00.00 WELL ADULT EXAM: Primary | ICD-10-CM

## 2025-07-28 DIAGNOSIS — E05.00 GRAVES' DISEASE: ICD-10-CM

## 2025-07-28 PROBLEM — F32.2 SEVERE MAJOR DEPRESSIVE DISORDER (HCC): Status: ACTIVE | Noted: 2025-07-28

## 2025-07-28 PROBLEM — E24.9 CUSHING'S SYNDROME (HCC): Status: RESOLVED | Noted: 2020-06-08 | Resolved: 2025-07-28

## 2025-07-28 PROCEDURE — 99396 PREV VISIT EST AGE 40-64: CPT | Performed by: FAMILY MEDICINE

## 2025-07-28 RX ORDER — FLUOXETINE 20 MG/1
20 TABLET, FILM COATED ORAL DAILY
Qty: 30 TABLET | Refills: 1 | Status: SHIPPED | OUTPATIENT
Start: 2025-07-28

## 2025-07-28 RX ORDER — PREGABALIN 75 MG/1
75 CAPSULE ORAL 2 TIMES DAILY
Qty: 180 CAPSULE | Refills: 1 | Status: SHIPPED | OUTPATIENT
Start: 2025-07-28

## 2025-07-28 RX ORDER — CYCLOBENZAPRINE HCL 10 MG
5 TABLET ORAL
COMMUNITY
Start: 2025-05-05

## 2025-07-28 RX ORDER — NABUMETONE 500 MG/1
500 TABLET, FILM COATED ORAL 2 TIMES DAILY
COMMUNITY
Start: 2025-03-26

## 2025-07-30 ENCOUNTER — TELEPHONE (OUTPATIENT)
Dept: FAMILY MEDICINE CLINIC | Facility: CLINIC | Age: 55
End: 2025-07-30

## 2025-08-01 ENCOUNTER — APPOINTMENT (OUTPATIENT)
Dept: LAB | Facility: CLINIC | Age: 55
End: 2025-08-01
Payer: COMMERCIAL

## 2025-08-01 DIAGNOSIS — R74.01 TRANSAMINITIS: ICD-10-CM

## 2025-08-01 DIAGNOSIS — E78.1 HYPERTRIGLYCERIDEMIA: ICD-10-CM

## 2025-08-01 DIAGNOSIS — R73.9 ELEVATED BLOOD SUGAR: ICD-10-CM

## 2025-08-01 DIAGNOSIS — E05.00 GRAVES' DISEASE: ICD-10-CM

## 2025-08-01 DIAGNOSIS — I10 BENIGN ESSENTIAL HYPERTENSION: ICD-10-CM

## 2025-08-01 LAB
ALBUMIN SERPL BCG-MCNC: 4.1 G/DL (ref 3.5–5)
ALP SERPL-CCNC: 66 U/L (ref 34–104)
ALT SERPL W P-5'-P-CCNC: 19 U/L (ref 7–52)
ANION GAP SERPL CALCULATED.3IONS-SCNC: 12 MMOL/L (ref 4–13)
AST SERPL W P-5'-P-CCNC: 25 U/L (ref 13–39)
BASOPHILS # BLD AUTO: 0.03 THOUSANDS/ÂΜL (ref 0–0.1)
BASOPHILS NFR BLD AUTO: 1 % (ref 0–1)
BILIRUB SERPL-MCNC: 0.5 MG/DL (ref 0.2–1)
BUN SERPL-MCNC: 18 MG/DL (ref 5–25)
CALCIUM SERPL-MCNC: 9.2 MG/DL (ref 8.4–10.2)
CHLORIDE SERPL-SCNC: 102 MMOL/L (ref 96–108)
CHOLEST SERPL-MCNC: 221 MG/DL (ref ?–200)
CO2 SERPL-SCNC: 24 MMOL/L (ref 21–32)
CREAT SERPL-MCNC: 0.84 MG/DL (ref 0.6–1.3)
EOSINOPHIL # BLD AUTO: 0.18 THOUSAND/ÂΜL (ref 0–0.61)
EOSINOPHIL NFR BLD AUTO: 3 % (ref 0–6)
ERYTHROCYTE [DISTWIDTH] IN BLOOD BY AUTOMATED COUNT: 12.4 % (ref 11.6–15.1)
EST. AVERAGE GLUCOSE BLD GHB EST-MCNC: 105 MG/DL
GFR SERPL CREATININE-BSD FRML MDRD: 77 ML/MIN/1.73SQ M
GLUCOSE P FAST SERPL-MCNC: 88 MG/DL (ref 65–99)
HBA1C MFR BLD: 5.3 %
HCT VFR BLD AUTO: 44.6 % (ref 34.8–46.1)
HDLC SERPL-MCNC: 62 MG/DL
HGB BLD-MCNC: 15.3 G/DL (ref 11.5–15.4)
IMM GRANULOCYTES # BLD AUTO: 0.01 THOUSAND/UL (ref 0–0.2)
IMM GRANULOCYTES NFR BLD AUTO: 0 % (ref 0–2)
LDLC SERPL CALC-MCNC: 134 MG/DL (ref 0–100)
LYMPHOCYTES # BLD AUTO: 1.61 THOUSANDS/ÂΜL (ref 0.6–4.47)
LYMPHOCYTES NFR BLD AUTO: 27 % (ref 14–44)
MCH RBC QN AUTO: 32 PG (ref 26.8–34.3)
MCHC RBC AUTO-ENTMCNC: 34.3 G/DL (ref 31.4–37.4)
MCV RBC AUTO: 93 FL (ref 82–98)
MONOCYTES # BLD AUTO: 0.48 THOUSAND/ÂΜL (ref 0.17–1.22)
MONOCYTES NFR BLD AUTO: 8 % (ref 4–12)
NEUTROPHILS # BLD AUTO: 3.63 THOUSANDS/ÂΜL (ref 1.85–7.62)
NEUTS SEG NFR BLD AUTO: 61 % (ref 43–75)
NRBC BLD AUTO-RTO: 0 /100 WBCS
PLATELET # BLD AUTO: 292 THOUSANDS/UL (ref 149–390)
PMV BLD AUTO: 9.7 FL (ref 8.9–12.7)
POTASSIUM SERPL-SCNC: 4.3 MMOL/L (ref 3.5–5.3)
PROT SERPL-MCNC: 7 G/DL (ref 6.4–8.4)
RBC # BLD AUTO: 4.78 MILLION/UL (ref 3.81–5.12)
SODIUM SERPL-SCNC: 138 MMOL/L (ref 135–147)
TRIGL SERPL-MCNC: 126 MG/DL (ref ?–150)
TSH SERPL DL<=0.05 MIU/L-ACNC: 1.75 UIU/ML (ref 0.45–4.5)
WBC # BLD AUTO: 5.94 THOUSAND/UL (ref 4.31–10.16)

## 2025-08-01 PROCEDURE — 84443 ASSAY THYROID STIM HORMONE: CPT

## 2025-08-01 PROCEDURE — 80061 LIPID PANEL: CPT

## 2025-08-01 PROCEDURE — 36415 COLL VENOUS BLD VENIPUNCTURE: CPT

## 2025-08-01 PROCEDURE — 85025 COMPLETE CBC W/AUTO DIFF WBC: CPT

## 2025-08-01 PROCEDURE — 80053 COMPREHEN METABOLIC PANEL: CPT

## 2025-08-01 PROCEDURE — 83036 HEMOGLOBIN GLYCOSYLATED A1C: CPT

## 2025-08-08 ENCOUNTER — OFFICE VISIT (OUTPATIENT)
Age: 55
End: 2025-08-08
Payer: COMMERCIAL

## 2025-08-08 VITALS — SYSTOLIC BLOOD PRESSURE: 110 MMHG | DIASTOLIC BLOOD PRESSURE: 68 MMHG

## 2025-08-08 DIAGNOSIS — Z12.31 ENCOUNTER FOR SCREENING MAMMOGRAM FOR MALIGNANT NEOPLASM OF BREAST: ICD-10-CM

## 2025-08-08 DIAGNOSIS — Z12.4 SCREENING FOR CERVICAL CANCER: ICD-10-CM

## 2025-08-08 DIAGNOSIS — G25.81 RESTLESS LEGS SYNDROME (RLS): ICD-10-CM

## 2025-08-08 DIAGNOSIS — Z01.419 ENCOUNTER FOR GYNECOLOGICAL EXAMINATION WITHOUT ABNORMAL FINDING: Primary | ICD-10-CM

## 2025-08-08 DIAGNOSIS — Z11.51 SCREENING FOR HPV (HUMAN PAPILLOMAVIRUS): ICD-10-CM

## 2025-08-08 DIAGNOSIS — N95.1 MENOPAUSAL SYMPTOMS: ICD-10-CM

## 2025-08-08 PROCEDURE — G0476 HPV COMBO ASSAY CA SCREEN: HCPCS | Performed by: OBSTETRICS & GYNECOLOGY

## 2025-08-08 PROCEDURE — S0610 ANNUAL GYNECOLOGICAL EXAMINA: HCPCS | Performed by: OBSTETRICS & GYNECOLOGY

## 2025-08-08 RX ORDER — PROGESTERONE 100 MG/1
100 CAPSULE ORAL
Qty: 30 CAPSULE | Refills: 11 | Status: SHIPPED | OUTPATIENT
Start: 2025-08-08

## 2025-08-13 LAB
LAB AP GYN PRIMARY INTERPRETATION: NORMAL
Lab: NORMAL

## 2025-08-20 DIAGNOSIS — F32.A DEPRESSION, UNSPECIFIED DEPRESSION TYPE: ICD-10-CM

## (undated) DEVICE — MASTISOL LIQ ADHESIVE 2/3ML

## (undated) DEVICE — GLOVE INDICATOR PI UNDERGLOVE SZ 7.5 BLUE

## (undated) DEVICE — SUT PROLENE 5-0 P-3 18 IN 8698G

## (undated) DEVICE — SYRINGE 10ML LL CONTROL TOP

## (undated) DEVICE — POV-IOD SOLUTION 4OZ BT

## (undated) DEVICE — STANDARD SURGICAL GOWN, L: Brand: CONVERTORS

## (undated) DEVICE — SUT PROLENE 6-0 P-3 18 IN 8695G

## (undated) DEVICE — SPECIMEN CONTAINER STERILE PEEL PACK

## (undated) DEVICE — SPLINT 1529010 10PK THERMASPLINT MEDIUM

## (undated) DEVICE — GLOVE INDICATOR PI UNDERGLOVE SZ 6.5 BLUE

## (undated) DEVICE — UNDYED MONOFILAMENT (POLYDIOXANONE), ABSORBABLE SYNTHETIC SURGICAL SUTURE: Brand: PDS

## (undated) DEVICE — NEEDLE 25G X 1 1/2

## (undated) DEVICE — SKIN MARKER DUAL TIP WITH RULER CAP, FLEXIBLE RULER AND LABELS: Brand: DEVON

## (undated) DEVICE — 3M™ STERI-STRIP™ REINFORCED ADHESIVE SKIN CLOSURES, R1547, 1/2 IN X 4 IN (12 MM X 100 MM), 6 STRIPS/ENVELOPE: Brand: 3M™ STERI-STRIP™

## (undated) DEVICE — GAUZE SPONGES,16 PLY: Brand: CURITY

## (undated) DEVICE — IV CATH 14 G SAFETY

## (undated) DEVICE — STERILE BETHLEHEM NASAL PACK: Brand: CARDINAL HEALTH

## (undated) DEVICE — NEEDLE 18 G X 1 1/2

## (undated) DEVICE — GLOVE SRG BIOGEL 6.5

## (undated) DEVICE — SPLINT 1524050 5PK PAIR DOYLE II AIRWAY: Brand: DOYLE II ™

## (undated) DEVICE — INTENDED FOR TISSUE SEPARATION, AND OTHER PROCEDURES THAT REQUIRE A SHARP SURGICAL BLADE TO PUNCTURE OR CUT.: Brand: BARD-PARKER ® CARBON RIB-BACK BLADES

## (undated) DEVICE — SYRINGE 10ML LL

## (undated) DEVICE — NEURO PATTIES 1/2 X 3

## (undated) DEVICE — PENCIL ELECTROSURG E-Z CLEAN -0035H

## (undated) DEVICE — ELECTRODE NEEDLE MOD E-Z CLEAN 2.75IN 7CM -0013M

## (undated) DEVICE — SUT CHROMIC 5-0 P-3 18 IN 687G

## (undated) DEVICE — TOWEL SURG XR DETECT GREEN STRL RFD

## (undated) DEVICE — SUT PLAIN 4-0 SC-1 18 IN 1828H

## (undated) DEVICE — SUT ETHILON 4-0 PS-2 18 IN 1667H

## (undated) DEVICE — SCD SEQUENTIAL COMPRESSION COMFORT SLEEVE MEDIUM KNEE LENGTH: Brand: KENDALL SCD

## (undated) DEVICE — 3000CC GUARDIAN II: Brand: GUARDIAN

## (undated) DEVICE — GLOVE SRG BIOGEL 7.5

## (undated) DEVICE — STRL COTTON TIP APPLCTR 6IN PK: Brand: CARDINAL HEALTH